# Patient Record
Sex: MALE | Race: WHITE | NOT HISPANIC OR LATINO | Employment: FULL TIME | URBAN - METROPOLITAN AREA
[De-identification: names, ages, dates, MRNs, and addresses within clinical notes are randomized per-mention and may not be internally consistent; named-entity substitution may affect disease eponyms.]

---

## 2018-10-29 ENCOUNTER — LAB (OUTPATIENT)
Dept: LAB | Facility: HOSPITAL | Age: 63
End: 2018-10-29

## 2018-10-29 ENCOUNTER — OFFICE VISIT (OUTPATIENT)
Dept: NEUROLOGY | Facility: CLINIC | Age: 63
End: 2018-10-29

## 2018-10-29 ENCOUNTER — APPOINTMENT (OUTPATIENT)
Dept: LAB | Facility: HOSPITAL | Age: 63
End: 2018-10-29

## 2018-10-29 VITALS
SYSTOLIC BLOOD PRESSURE: 123 MMHG | DIASTOLIC BLOOD PRESSURE: 78 MMHG | WEIGHT: 237 LBS | HEIGHT: 78 IN | BODY MASS INDEX: 27.42 KG/M2

## 2018-10-29 DIAGNOSIS — R26.89 IMBALANCE: Primary | ICD-10-CM

## 2018-10-29 DIAGNOSIS — G62.9 NEUROPATHY: ICD-10-CM

## 2018-10-29 LAB — VIT B12 BLD-MCNC: 1302 PG/ML (ref 211–911)

## 2018-10-29 PROCEDURE — 36415 COLL VENOUS BLD VENIPUNCTURE: CPT

## 2018-10-29 PROCEDURE — 99204 OFFICE O/P NEW MOD 45 MIN: CPT | Performed by: PSYCHIATRY & NEUROLOGY

## 2018-10-29 PROCEDURE — 82525 ASSAY OF COPPER: CPT

## 2018-10-29 PROCEDURE — 84446 ASSAY OF VITAMIN E: CPT

## 2018-10-29 PROCEDURE — 82607 VITAMIN B-12: CPT

## 2018-10-29 RX ORDER — BRIMONIDINE TARTRATE/TIMOLOL 0.2%-0.5%
DROPS OPHTHALMIC (EYE)
COMMUNITY
Start: 2018-09-23 | End: 2021-01-26

## 2018-10-29 RX ORDER — METFORMIN HYDROCHLORIDE 500 MG/1
500 TABLET, FILM COATED, EXTENDED RELEASE ORAL 2 TIMES DAILY
COMMUNITY
Start: 2013-09-05 | End: 2020-10-13 | Stop reason: SDUPTHER

## 2018-10-29 RX ORDER — GABAPENTIN 100 MG/1
CAPSULE ORAL 3 TIMES DAILY
COMMUNITY
Start: 2018-10-08 | End: 2018-10-29 | Stop reason: SDUPTHER

## 2018-10-29 RX ORDER — GABAPENTIN 100 MG/1
200 CAPSULE ORAL 3 TIMES DAILY
Qty: 180 CAPSULE | Refills: 1 | Status: SHIPPED | OUTPATIENT
Start: 2018-10-29 | End: 2019-06-07 | Stop reason: SDUPTHER

## 2018-10-29 RX ORDER — LEVOTHYROXINE SODIUM 0.05 MG/1
TABLET ORAL DAILY
COMMUNITY
Start: 2013-09-05 | End: 2018-11-29

## 2018-10-29 RX ORDER — ATORVASTATIN CALCIUM 10 MG/1
TABLET, FILM COATED ORAL
COMMUNITY
Start: 2018-10-07 | End: 2021-01-26

## 2018-10-29 RX ORDER — SERTRALINE HYDROCHLORIDE 100 MG/1
TABLET, FILM COATED ORAL DAILY
COMMUNITY
Start: 2013-09-05 | End: 2022-04-20

## 2018-10-29 RX ORDER — PEN NEEDLE, DIABETIC 29 G X1/2"
NEEDLE, DISPOSABLE MISCELLANEOUS
COMMUNITY
Start: 2018-09-04

## 2018-10-29 RX ORDER — ENALAPRIL MALEATE 5 MG/1
TABLET ORAL
COMMUNITY
Start: 2018-10-07 | End: 2021-01-26

## 2018-10-29 RX ORDER — CANAGLIFLOZIN 300 MG/1
TABLET, FILM COATED ORAL
COMMUNITY
Start: 2018-10-07 | End: 2020-10-09

## 2018-10-29 RX ORDER — EXENATIDE 2 MG/.85ML
INJECTION, SUSPENSION, EXTENDED RELEASE SUBCUTANEOUS
COMMUNITY
Start: 2018-10-09 | End: 2020-10-13 | Stop reason: SDUPTHER

## 2018-10-29 NOTE — PROGRESS NOTES
Subjective:    CC: Tom Blair is seen today in consultation at the request of Mary Kahn,* for Peripheral Neuropathy (leg pain,   foot drop left foot )       HPI:  Patient is a 63-year-old male with past medical history of type 2 diabetes referred to the clinic for the evaluation of peripheral neuropathy.  He reports that he was diagnosed with diabetes about 6 years ago and started having  pain, tingling and numbness involving both his feet about 3 years ago.  Over the period of time, the symptoms have become worse and it has affected his balance.  He reports that the when he walks, his left foot drags on the ground and that the has affected his balance and walking.  He has seen podiatrist in the past for this problem and has been wearing orthotics in both his shoes which has helped somewhat.  He reports that the pain is the most significant symptom associated with neuropathy.  It involves top of his feet as well as bottom of his feet.  He denies similar symptoms in his hands.  He tries to keep blood sugars under good control and tries to keep HB A1c between 6-7.  He was recently started on Neurontin 100 mg at bedtime which was recently increased to 100 mg twice a day and he reports that it has helped better control pain but he still gets the pain.  He denies any side effects with  Neurontin use.  It is difficult for him to walk in darker environment.    The following portions of the patient's history were reviewed today and updated as of 10/29/2018  : allergies, social history and problem list.  This document will be scanned to patient's chart.      Current Outpatient Prescriptions:   •  aspirin 81 MG tablet, Take  by mouth Daily., Disp: , Rfl:   •  atorvastatin (LIPITOR) 10 MG tablet, , Disp: , Rfl:   •  BYDUREON BCISE 2 MG/0.85ML auto-injector injection, , Disp: , Rfl:   •  Cholecalciferol (VITAMIN D) 1000 units tablet, Take  by mouth., Disp: , Rfl:   •  COMBIGAN 0.2-0.5 % ophthalmic solution, ,  "Disp: , Rfl:   •  enalapril (VASOTEC) 5 MG tablet, , Disp: , Rfl:   •  FORTEO 600 MCG/2.4ML injection, , Disp: , Rfl:   •  gabapentin (NEURONTIN) 100 MG capsule, Take 2 capsules by mouth 3 (Three) Times a Day., Disp: 180 capsule, Rfl: 1  •  INVOKANA 300 MG tablet, , Disp: , Rfl:   •  levothyroxine (LEVOXYL) 50 MCG tablet, Take  by mouth Daily., Disp: , Rfl:   •  metFORMIN (GLUMETZA) 500 MG (MOD) 24 hr tablet, Take  by mouth., Disp: , Rfl:   •  metoprolol tartrate (LOPRESSOR) 25 MG tablet, Take  by mouth Daily (Monday-Friday)., Disp: , Rfl:   •  sertraline (ZOLOFT) 100 MG tablet, Take  by mouth Daily., Disp: , Rfl:   •  ULTICARE MINI PEN NEEDLES 31G X 6 MM misc, , Disp: , Rfl:    Past Medical History:   Diagnosis Date   • Diabetes mellitus (CMS/HCC)    • Hyperlipidemia       No past surgical history on file.   Family History   Problem Relation Age of Onset   • Alzheimer's disease Mother    • Dementia Mother    • Diabetes Mother    • Cancer Father    • Diabetes Father    • Heart disease Father       Review of Systems   Constitutional: Negative.    HENT: Negative.    Eyes: Positive for blurred vision.   Respiratory: Negative.    Cardiovascular: Negative.    Gastrointestinal: Negative.    Endocrine: Negative.    Genitourinary: Negative.    Neurological: Positive for numbness.   Hematological: Negative.        All other systems reviewed and are negative     Objective:    /78   Ht 200.7 cm (79\")   Wt 108 kg (237 lb)   BMI 26.70 kg/m²     Neurology Exam:    General apperance: NAD.     Mental status: Alert, awake and oriented to time place and person.    Recent and Remote memory: Can recall 3/3 objects at 5 minutes. Can recall historical events.     Attention span and Concentration: Serial 7s: Normal.     Fund of knowledge:  Normal.     Language and Speech: No aphasia or dysarthria.    Naming , Repitition and Comprehension:  Can name objects, repeat a sentence and follow commands. Speech is clear and fluent with " good repetition, comprehension, and naming.    Cranial Nerves:   CN II: Visual fields are full. Intact. Fundi - Normal, No papillederma, Pupils - CARO  CN III, IV and VI: Extraocular movements are intact. Normal saccades.   CN V: Facial sensation is intact.   CN VII: Muscles of facial expression reveal no asymmetry. Intact.   CN VIII: Hearing is intact. Whispered voice intact.   CN IX and X: Palate elevates symmetrically. Intact  CN XI: Shoulder shrug is intact.   CN XII: Tongue is midline without evidence of atrophy or fasciculation.     Motor:  Right UE muscle strength 5/5. Normal tone.     Left UE muscle strength 5/5. Normal tone.      Right LE muscle strength5/5. Normal tone.     Left LE muscle strength 5/5. Normal tone.      Sensory: Normal light touch, vibration and pinprick sensation bilaterally.    DTRs: 2+ bilaterally in upper extremities and 2+ right knee 3+ left knee and 1+ bilateral ankles.    Babinski: Positive bilaterally.    Co-ordination: Normal finger-to-nose, heel to shin B/L.    Rhomberg: Negative.    Gait: Normal.    Cardiovascular: Regular rate and rhythm without murmur, gallop or rub.    Assessment and Plan:  1. Neuropathy  Patient with long-term history of type 2 diabetes presenting with the pain involving both his feet as well as balance issues caused by diabetic neuropathy.  Since Neurontin 100 mg twice a day dose has helped somewhat with pain, it will be increased to 200 mg 3 times a day dose in next 4 weeks for symptomatic relief.  I explained to him that I did not appreciate any dorsiflexion weakness and I did not appreciate foot drop on theleft however, when he walks he does drags his left foot somewhat.  He does have high arched feet bilaterally which is affecting his balance as well.  Continue using the orthotics.  Will refer him to balance and gait therapy.  I have advised him to continue with the strict glycemic control to prevent further progression of neuropathy as well.On my  exam, he was found to have  Babinski positive bilaterally and very  brisk left knee DTR.   Will order MRI brain for further evaluation as well as vitamin B12, copper  the vitamin E levels to rule out possibility of myeloneuropathy.  - Vitamin B12; Future  - Copper, Serum; Future  - Vitamin E; Future    2. Imbalance    - Ambulatory Referral to Physical Therapy (Balance and Gait therapy )  - MRI Brain Without Contrast; Future       No Follow-up on file.     Delfino Gunderson MD

## 2018-10-30 ENCOUNTER — TELEPHONE (OUTPATIENT)
Dept: NEUROLOGY | Facility: CLINIC | Age: 63
End: 2018-10-30

## 2018-10-30 NOTE — TELEPHONE ENCOUNTER
----- Message from Delfino Gunderson MD sent at 10/30/2018  9:14 AM EDT -----  Inform patient normal.  B12 levels are above normal.

## 2018-11-01 LAB — COPPER SERPL-MCNC: 111 UG/DL (ref 72–166)

## 2018-11-02 LAB
A-TOCOPHEROL VIT E SERPL-MCNC: 10.8 MG/L (ref 9–29)
GAMMA TOCOPHEROL SERPL-MCNC: 2.1 MG/L (ref 0.5–4.9)

## 2018-11-06 ENCOUNTER — TELEPHONE (OUTPATIENT)
Dept: NEUROLOGY | Facility: CLINIC | Age: 63
End: 2018-11-06

## 2018-11-06 NOTE — TELEPHONE ENCOUNTER
----- Message from Delfino Gunderson MD sent at 11/5/2018  4:18 PM EST -----  Inform patient normal.  Vitamin E level is normal.

## 2018-11-29 ENCOUNTER — OFFICE VISIT (OUTPATIENT)
Dept: NEUROLOGY | Facility: CLINIC | Age: 63
End: 2018-11-29

## 2018-11-29 VITALS
SYSTOLIC BLOOD PRESSURE: 151 MMHG | WEIGHT: 237 LBS | HEIGHT: 78 IN | DIASTOLIC BLOOD PRESSURE: 88 MMHG | BODY MASS INDEX: 27.42 KG/M2

## 2018-11-29 DIAGNOSIS — G62.9 NEUROPATHY: ICD-10-CM

## 2018-11-29 DIAGNOSIS — R26.89 IMBALANCE: ICD-10-CM

## 2018-11-29 PROCEDURE — 99213 OFFICE O/P EST LOW 20 MIN: CPT | Performed by: PSYCHIATRY & NEUROLOGY

## 2018-11-29 NOTE — PROGRESS NOTES
Subjective:    CC: Tom Blair is in clinic today for follow up for  diabetic neuropathy and bilateral leg weakness.    HPI:  He is in clinic for regular follow-up.  Since the last visit, he reports that the increasing gabapentin to 200 mg 3 times a day dose has helped significantly reduce the pain, tingling and numbness associated with diabetic neuropathy.  He continues to do physical therapy as per schedule.  He had MRI brain without contrast which I reviewed personally and it did not reveal any acute intracranial abnormalities.  Mild nonspecific chronic small vessel changes were seen.  Vitamin E and Copper levels are normal.    The following portions of the patient's history were reviewed and updated as of 11/29/2018: allergies, social history and problem list.       Current Outpatient Medications:   •  aspirin 81 MG tablet, Take  by mouth Daily., Disp: , Rfl:   •  atorvastatin (LIPITOR) 10 MG tablet, , Disp: , Rfl:   •  BYDUREON BCISE 2 MG/0.85ML auto-injector injection, , Disp: , Rfl:   •  Cholecalciferol (VITAMIN D) 1000 units tablet, Take  by mouth., Disp: , Rfl:   •  COMBIGAN 0.2-0.5 % ophthalmic solution, , Disp: , Rfl:   •  enalapril (VASOTEC) 5 MG tablet, , Disp: , Rfl:   •  FORTEO 600 MCG/2.4ML injection, , Disp: , Rfl:   •  gabapentin (NEURONTIN) 100 MG capsule, Take 2 capsules by mouth 3 (Three) Times a Day., Disp: 180 capsule, Rfl: 1  •  INVOKANA 300 MG tablet, , Disp: , Rfl:   •  metFORMIN (GLUMETZA) 500 MG (MOD) 24 hr tablet, Take 500 mg by mouth 2 (Two) Times a Day., Disp: , Rfl:   •  metoprolol tartrate (LOPRESSOR) 25 MG tablet, Take  by mouth Daily (Monday-Friday)., Disp: , Rfl:   •  sertraline (ZOLOFT) 100 MG tablet, Take  by mouth Daily., Disp: , Rfl:   •  ULTICARE MINI PEN NEEDLES 31G X 6 MM misc, , Disp: , Rfl:    Past Medical History:   Diagnosis Date   • Diabetes mellitus (CMS/HCC)    • Hyperlipidemia       No past surgical history on file.   Family History   Problem Relation Age of Onset  "  • Alzheimer's disease Mother    • Dementia Mother    • Diabetes Mother    • Cancer Father    • Diabetes Father    • Heart disease Father         Review of Systems  Objective:    /88   Ht 200.7 cm (79.02\")   Wt 108 kg (237 lb)   BMI 26.69 kg/m²     Neurology Exam:    General apperance: NAD.     Mental status: Alert, awake and oriented to time place and person.    Recent and Remote memory: Can recall 3/3 objects at 5 minutes. Can recall historical events.     Attention span and Concentration: Serial 7s: Normal.     Fund of knowledge:  Normal.     Language and Speech: No aphasia or dysarthria.    Naming , Repitition and Comprehension:  Can name objects, repeat a sentence and follow commands. Speech is clear and fluent with good repetition, comprehension, and naming.    Cranial Nerves:   CN II: Visual fields are full. Intact. Fundi - Normal, No papillederma, Pupils - CARO  CN III, IV and VI: Extraocular movements are intact. Normal saccades.   CN V: Facial sensation is intact.   CN VII: Muscles of facial expression reveal no asymmetry. Intact.   CN VIII: Hearing is intact. Whispered voice intact.   CN IX and X: Palate elevates symmetrically. Intact  CN XI: Shoulder shrug is intact.   CN XII: Tongue is midline without evidence of atrophy or fasciculation.     Motor:  Right UE muscle strength 5/5. Normal tone.     Left UE muscle strength 5/5. Normal tone.      Right LE muscle strength5/5 except the mild dorsiflexion weakness.    Left LE muscle strength 5/5 except mild dorsiflexion weakness.    Sensory: Normal light touch, vibration and pinprick sensation bilaterally.    DTRs: 2+ bilaterally in upper extremities and 2+ right knee 3+ left knee and 1+ bilateral ankles.     Babinski: Positive bilaterally.    Co-ordination: Normal finger-to-nose, heel to shin B/L.    Rhomberg: Negative.    Gait: Normal.    Cardiovascular: Regular rate and rhythm without murmur, gallop or rub.    Assessment and Plan:  1. " Neuropathy  Diabetic neuropathy in a patient with long-term history of diabetes.  With gabapentin 200 mg 3 times a day dose, the pain is significantly better.  However, he continues to have difficulty with balance.  MRI brain was unremarkable.  Vitamin E and Copper levels were normal.  Today on my examination, I noticed the fasciculation involving both the calf muscles.  In addition to that, he has positive Babinski bilaterally.  I will order EMG/nerve conduction study for further evaluation and to rule out possibility of motor neuron disease.  No tongue fasciculations were noted.    2. Imbalance  He continues to have difficulty with balance.  I've advised him to continue with physical therapy.  Continue using gabapentin as per schedule.  I'll see him back in clinic in 6 weeks.       I spent 15 minutes face to face with the patient and spent 10 minutes of this time counseling and discussing about taking medication regularly, possible side effects with medication use, importance of good sleep hygiene, good hydration and regular exercise.    Return in about 6 weeks (around 1/10/2019).

## 2018-12-04 ENCOUNTER — HOSPITAL ENCOUNTER (OUTPATIENT)
Dept: PHYSICAL THERAPY | Facility: HOSPITAL | Age: 63
Setting detail: THERAPIES SERIES
Discharge: HOME | End: 2018-12-04

## 2018-12-04 ENCOUNTER — TRANSCRIBE ORDERS (OUTPATIENT)
Dept: PHYSICAL THERAPY | Facility: HOSPITAL | Age: 63
End: 2018-12-04

## 2018-12-04 DIAGNOSIS — R26.89 BALANCE PROBLEM: Primary | ICD-10-CM

## 2018-12-04 DIAGNOSIS — M21.372 LEFT FOOT DROP: Primary | ICD-10-CM

## 2018-12-04 DIAGNOSIS — R26.89 BALANCE PROBLEM: ICD-10-CM

## 2018-12-04 DIAGNOSIS — R26.9 GAIT ABNORMALITY: ICD-10-CM

## 2018-12-04 DIAGNOSIS — M21.372 LEFT FOOT DROP: ICD-10-CM

## 2018-12-04 DIAGNOSIS — R26.9 GAIT DIFFICULTY: ICD-10-CM

## 2018-12-04 PROCEDURE — 97110 THERAPEUTIC EXERCISES: CPT | Performed by: PHYSICAL THERAPIST

## 2018-12-04 PROCEDURE — 97161 PT EVAL LOW COMPLEX 20 MIN: CPT | Performed by: PHYSICAL THERAPIST

## 2018-12-04 NOTE — THERAPY EVALUATION
.Outpatient Physical Therapy Neuro Initial Evaluation  [facility]     Patient Name: Tom Blair  : 1955  MRN: [id number]  Today's Date: 2018      Visit Date: 2018    There is no problem list on file for this patient.       Past Medical History:   Diagnosis Date   • Diabetes mellitus (CMS/[facility])    • Hyperlipidemia         History reviewed. No pertinent surgical history.      Visit Dx:     ICD-10-CM ICD-9-CM   1. Balance problem R26.89 781.99   2. Gait abnormality R26.9 781.2   3. Left foot drop M21.372 736.79       Patient History     Row Name 18 1000             History    Chief Complaint  Balance Problems;Pain  -MW      Type of Pain  Hip pain right  -MW      Date Current Problem(s) Began  -- 6 years ago  -MW      Brief Description of Current Complaint  Pt. reports that he was diagnosed with diabetes about 6 years ago and started having  pain, tingling and numbness involving both his feet about 3 years ago.  Over the period of time, the symptoms have become worse and it has affected his balance.  He reports that the when he walks, his left foot drags on the ground and that the has affected his balance and walking.  He has seen podiatrist in the past for this problem and has been wearing orthotics in both his shoes which has helped somewhat.  He reports that the pain is the most significant symptom associated with neuropathy.  It involves top of his feet as well as bottom of his feet.  He denies similar symptoms in his hands.  He tries to keep blood sugars under good control and tries to keep HB A1c between 6-7. Pt. was seeing PT at  for two months for balance but pt reports that it did not improve.  -MW      Patient/Caregiver Goals  -- improve balance, decrease pain in R hip  -MW      Hand Dominance  right-handed  -MW      Occupation/sports/leisure activities  national account rep; semi-retired  -MW      What clinical tests have you had for this problem?  -- pt scheduled to have nerve  conduction  -MW         Pain     Pain Location  Hip right  -MW      Pain at Present  2  -MW      Pain at Best  0  -MW      Pain at Worst  9  -MW      Pain Frequency  Intermittent  -MW      Pain Description  Stabbing;Burning  -MW      What Performance Factors Make the Current Problem(s) WORSE?  walking, exercising (cycling 15 miles day: indoor)  -MW      What Performance Factors Make the Current Problem(s) BETTER?  mornings, gabapentin/tylenol  -MW      Is your sleep disturbed?  No  -MW         Fall Risk Assessment    Any falls in the past year:  Yes  -MW      Number of falls reported in the last 12 months  5  -MW      Factors that contributed to the fall:  Tripped;Lost balance  -MW         Daily Activities    Primary Language  English  -      Teaching needs identified  Home Exercise Program;Falls Prevention;Management of Condition  -MW      Pt Participated in POC and Goals  Yes  -MW        User Key  (r) = Recorded By, (t) = Taken By, (c) = Cosigned By    Initials Name Provider Type    MW Mercedes Anderson, PT Physical Therapist              PT Neuro     Row Name 12/04/18 1000             Precautions and Contraindications    Precautions/Limitations  fall precautions  -MW         Subjective Pain    Able to rate subjective pain?  yes  -MW      Pre-Treatment Pain Level  2  -MW      Post-Treatment Pain Level  2  -MW      Subjective Pain Comment  R hip  -MW         Home Living    Living Arrangements  [place]  -      Home Accessibility  stairs to enter home;stairs within home  -MW      Number of Stairs, Main Entrance  one  -MW      Stair Railings, Main Entrance  none post  -MW      Number of Stairs, Second Entrance  -- 14  -MW      Stair Railings, Second Entrance  railing on left side (ascending)  -      Home Equipment  Cane;Grab bars  -      Living Environment Comment  lives with wife; she works at [employer], son in med school staying at the house  -         Vision-Basic Assessment    Current Vision  Wears glasses all  the time  -MW         Cognition    Overall Cognitive Status  WFL  -MW         Sensation    Light Touch  Partial deficits in the RLE;Partial deficits in the LLE  -MW      Additional Comments  decreased B LEs below mid calf down into toes  -MW         Proprioception    Proprioception  absent B big toes  -MW         Posture/Observations    Posture- WNL  Posture is WNL  -MW         Coordination    Coordination Tests  Rapid Alternating  -MW      Rapid Alternating  Impaired;Left:  -MW         General ROM    RT Lower Ext  Rt Ankle Dorsiflexion  -MW      LT Lower Ext  Lt Ankle Dorsiflexion  -MW      GENERAL ROM COMMENTS  --  -MW         Right Lower Ext    Rt Ankle Dorsiflexion AROM  11 degrees  -MW         Left Lower Ext    Lt Ankle Dorsiflexion AROM  4 degrees  -MW         MMT (Manual Muscle Testing)    Rt Lower Ext  Rt Hip Flexion;Rt Hip Extension;Rt Hip ABduction;Rt Hip ADduction;Rt Knee Extension;Rt Knee Flexion;Rt Ankle Plantarflexion;Rt Ankle Dorsiflexion  -MW      Lt Lower Ext  Lt Hip Flexion;Lt Hip Extension;Lt Hip ABduction;Lt Hip ADduction;Lt Knee Extension;Lt Knee Flexion;Lt Ankle Plantarflexion;Lt Ankle Dorsiflexion  -MW         MMT Right Lower Ext    Rt Hip Flexion MMT, Gross Movement  (4-/5) good minus  -MW      Rt Hip Extension MMT, Gross Movement  (3+/5) fair plus  -MW      Rt Hip ABduction MMT, Gross Movement  (3+/5) fair plus  -MW      Rt Hip ADduction MMT, Gross Movement  (4-/5) good minus  -MW      Rt Knee Extension MMT, Gross Movement  (4-/5) good minus  -MW      Rt Knee Flexion MMT, Gross Movement  (4-/5) good minus  -MW      Rt Ankle Plantarflexion MMT, Gross Movement  (4-/5) good minus  -MW      Rt Ankle Dorsiflexion MMT, Gross Movement  (4-/5) good minus  -MW         MMT Left Lower Ext    Lt Hip Flexion MMT, Gross Movement  (3+/5) fair plus  -MW      Lt Hip Extension MMT, Gross Movement  (3+/5) fair plus  -MW      Lt Hip ABduction MMT, Gross Movement  (3+/5) fair plus  -MW      Lt Hip ADduction MMT,  Gross Movement  (4-/5) good minus  -MW      Lt Knee Extension MMT, Gross Movement  (4-/5) good minus  -MW      Lt Knee Flexion MMT, Gross Movement  (3+/5) fair plus  -MW      Lt Ankle Plantarflexion MMT, Gross Movement  (3-/5) fair minus  -MW      Lt Ankle Dorsiflexion MMT, Gross Movement  (3-/5) fair minus  -MW         Bed Mobility Assessment/Treatment    Comment (Bed Mobility)  independent  -MW         Transfers    Bed-Chair Tribune (Transfers)  supervision  -MW      Chair-Bed Tribune (Transfers)  supervision  -MW      Sit-Stand Tribune (Transfers)  supervision UE A  -MW      Stand-Sit Tribune (Transfers)  supervision UE A  -MW         Gait/Stairs Assessment/Training    Tribune Level (Gait)  contact guard  -MW      Deviations/Abnormal Patterns (Gait)  gait speed decreased;ruby decreased  -MW      Bilateral Gait Deviations  forward flexed posture  -MW      Left Sided Gait Deviations  foot drop/toe drag;hip circumduction  -MW      Tribune Level (Stairs)  contact guard  -MW      Handrail Location (Stairs)  right side (ascending)  -MW      Number of Steps (Stairs)  12  -MW      Ascending Technique (Stairs)  step-over-step  -MW      Descending Technique (Stairs)  step-over-step  -MW      Stairs, Safety Issues  balance decreased during turns  -MW        User Key  (r) = Recorded By, (t) = Taken By, (c) = Cosigned By    Initials Name Provider Type    Mercedes Arana, PT Physical Therapist                  Therapy Education  Given: HEP  Program: New  How Provided: Verbal, Demonstration, Written  Provided to: Patient  Level of Understanding: Verbalized, Demonstrated, Teach back education performed    PT OP Goals     Row Name 12/04/18 1100          PT Short Term Goals    STG Date to Achieve  01/15/19  -MW     STG 1  Patient to improve VALLEJO balance score to >/= 43/56 to decrease client's risk of falls.  -MW     STG 1 Progress  New  -MW     STG 2  Patient to perform TUG within 13 sec  without LOB for improved functional mobility.  -     STG 2 Progress  New  -MW     STG 3  Patient to improve FGA score to >/= 15/30 to decrease client's risk of falls.  -MW     STG 3 Progress  New  -MW     STG 4  Pt. to be able to rosangela/doff L AFO/toe-off brace for improved gait mechanics.  -     STG 4 Progress  New  -        Long Term Goals    LTG Date to Achieve  02/26/19  -MW     LTG 1  Patient to improve VALLEJO balance score to >/= 50/56 to decrease client's risk of falls.  -MW     LTG 1 Progress  New  -MW     LTG 2  Patient to perform TUG within 11 sec without LOB for improved functional mobility.  -MW     LTG 2 Progress  New  -MW     LTG 3  Patient to improve FGA score to >/= 24/30 to decrease client's risk of falls.  -MW     LTG 3 Progress  New  -MW     LTG 4  Patient to ambulate 10 meters without AD within 8 sec without LOB for improved gait ruby and functional mobility.  -     LTG 4 Progress  New  -     LTG 5  Pt. to be I with HEP.  -     LTG 5 Progress  New  -        Time Calculation    PT Goal Re-Cert Due Date  03/04/19  -       User Key  (r) = Recorded By, (t) = Taken By, (c) = Cosigned By    Initials Name Provider Type    Mercedes Arana, PT Physical Therapist          PT Assessment/Plan     Row Name 12/04/18 1513 12/04/18 1000       PT Assessment    Functional Limitations  --  Decreased safety during functional activities;Impaired gait;Impaired locomotion;Limitations in community activities;Performance in work activities  -    Impairments  --  Balance;Endurance;Gait;Locomotion;Muscle strength;Pain;Range of motion;Sensation;Posture impaired proprioception  -    Assessment Comments  --  -  Pt. presents with stable impairements secondary to a DM/neuropathy diagnosis.  Pt. to benefit from PT services to improve gait, balance, strength, transfers and overall functional mobility.  Pt. to benefit from L AFO/toe-off brace and asked for order.  Pt. issued HEP for balance, see for  details.  -MW    Please refer to paper survey for additional self-reported information  --  Yes  -MW    Rehab Potential  --  Good  -MW    Patient/caregiver participated in establishment of treatment plan and goals  --  Yes  -MW    Patient would benefit from skilled therapy intervention  --  Yes  -MW       PT Plan    PT Frequency  --  1x/week  -MW    Predicted Duration of Therapy Intervention (Therapy Eval)  --  12 visits  -MW    Planned CPT's?  --  PT EVAL LOW COMPLEXITY: 17565;PT THER PROC EA 15 MIN: 17767;PT NEUROMUSC RE-EDUCATION EA 15 MIN: 39252;PT GAIT TRAINING EA 15 MIN: 51512  -MW    PT Plan Comments  --  PT services to improve gait, balance, strength, transfers and overall functional mobility.  -MW      User Key  (r) = Recorded By, (t) = Taken By, (c) = Cosigned By    Initials Name Provider Type    Mercedes Arana, PT Physical Therapist             Exercises     Row Name 12/04/18 1000             Subjective Pain    Able to rate subjective pain?  yes  -MW      Pre-Treatment Pain Level  2  -MW      Post-Treatment Pain Level  2  -MW      Subjective Pain Comment  R hip  -MW         Total Minutes    37876 - PT Therapeutic Exercise Minutes  10  -MW         Exercise 1    Exercise Name 1  Issued and performed HEP, see for details.  -MW      Time 1  10 min  -MW        User Key  (r) = Recorded By, (t) = Taken By, (c) = Cosigned By    Initials Name Provider Type    Mercedes Arana, PT Physical Therapist                      Outcome Measure Options: 10 Meter Walk, James Balance, FGA (Functional Gait Assessment), Timed Up and Go (TUG)  10 Meter Walk Test Self-Selected Velocity  Self-Selected Velocity: Trial 1: 10.78 sec.(CGA)  10 Meter Walk Test Fast Velocity  10 Meter Walk Fast Velocity: Trial 1: 9.49 sec.(CGA)  James Balance Scale  Sitting to Standing: able to stand independently using hands  Standing Unsupported: able to stand safely for 2 minutes  Sitting with Back Unsupported but Feet Supported on Floor or  on Stool: able to sit safely and securely for 2 minutes  Standing to Sitting: controls descent by using hands  Transfers: able to transfer safely definite need of hands  Standing Unsupported with Eyes Closed: able to stand 10 seconds with supervision  Standing Unsupported with Feet Together: able to place feet together independently and stand 1 minute with supervision  Reaching Forward with Outstretched Arm While Standing: can reach forward confidently 25 cm (10 inches)   Object From the Floor From a Standing Position: able to  object but needs supervision  Turning to Look Behind Over Left and Right Shoulders While Standing: needs assist to keep from losing balance or falling  Turn 360 Degrees: able to turn 360 degrees safely but slowly  Place Alternate Foot on Step or Stool While Standing Unsupported: able to complete > 2 steps needs minimal assist  Standing Unsupported with One Foot in Front: able to place foot ahead independently and hold 30 seconds  Standing on One Leg: tries to lift leg unable to hold 3 seconds but remains standing independently  James Total Score: 37  Functional Gait Assessment (FGA)  Gait Level Surface: Moderate Impairment  Change in Gait Speed: Moderate Impairment  Gait with Horizontal Head Turns: Severe Impairment  Gait with Vertical Head Turns: Severe Impairment  Gait and Pivot Turn: Mild Impairment  Step Over Obstacle: Moderate Impairment  Gait with Narrow Base of Support: Severe Impairment  Gait with Eyes Closed: Severe Impairment  Ambulating Backwards: Moderate Impairment  Steps: Mild Impairment  FGA Total Score: 8  Timed Up and Go (TUG)  TUG Test 1: 15.04 seconds(CGA)  TUG Test 2: 12.43 seconds(min A with LOB turning to sit into chair)    Time Calculation:   Start Time: 1000   Therapy Suggested Charges     Code   Minutes Charges    33432 (CPT®) Hc Pt Neuromusc Re Education Ea 15 Min      84509 (CPT®) Hc Pt Ther Proc Ea 15 Min 10 1    95682 (CPT®) Hc Gait Training Ea 15 Min       50219 (CPT®) Hc Pt Therapeutic Act Ea 15 Min      47568 (CPT®) Hc Pt Manual Therapy Ea 15 Min      53733 (CPT®) Hc Pt Ther Massage- Per 15 Min      28327 (CPT®) Hc Pt Iontophoresis Ea 15 Min      04836 (CPT®) Hc Pt Elec Stim Ea-Per 15 Min      77800 (CPT®) Hc Pt Ultrasound Ea 15 Min      59106 (CPT®) Hc Pt Self Care/Mgmt/Train Ea 15 Min      42781 (CPT®) Hc Pt Prosthetic (S) Train Initial Encounter, Each 15 Min      81742 (CPT®) Hc Orthotic(S) Mgmt/Train Initial Encounter, Each 15min      65942 (CPT®) Hc Pt Aquatic Therapy Ea 15 Min      58941 (CPT®) Hc Pt Orthotic(S)/Prosthetic(S) Encounter, Each 15 Min       (CPT®) Hc Pt Electrical Stim Unattended      Total  10 1        Therapy Charges for Today     Code Description Service Date Service Provider Modifiers Qty    67025884532 HC PT EVAL LOW COMPLEXITY 4 12/4/2018 Mercedes Anderson, PT GP 1    05379215876 HC PT THER PROC EA 15 MIN 12/4/2018 Mercedes Anderson, PT GP 1          PT G-Codes  Outcome Measure Options: 10 Meter Walk, James Balance, FGA (Functional Gait Assessment), Timed Up and Go (TUG)  James Total Score: 37  FGA Total Score: 8  TUG Test 1: 15.04 seconds(CGA)  TUG Test 2: 12.43 seconds(min A with LOB turning to sit into chair)         Mercedes Anderson, PT  12/4/2018

## 2018-12-10 ENCOUNTER — HOSPITAL ENCOUNTER (OUTPATIENT)
Dept: PHYSICAL THERAPY | Facility: HOSPITAL | Age: 63
Setting detail: THERAPIES SERIES
Discharge: HOME | End: 2018-12-10

## 2018-12-10 DIAGNOSIS — R26.9 GAIT ABNORMALITY: ICD-10-CM

## 2018-12-10 DIAGNOSIS — R26.89 BALANCE PROBLEM: Primary | ICD-10-CM

## 2018-12-10 PROCEDURE — 97112 NEUROMUSCULAR REEDUCATION: CPT | Performed by: PHYSICAL THERAPIST

## 2018-12-10 PROCEDURE — 97110 THERAPEUTIC EXERCISES: CPT | Performed by: PHYSICAL THERAPIST

## 2018-12-10 NOTE — THERAPY TREATMENT NOTE
"    Outpatient Physical Therapy Neuro Treatment Note  [facility]     Patient Name: Tom Blair  : 1955  MRN: [id number]  Today's Date: 12/10/2018      Visit Date: 12/10/2018    Visit Dx:    ICD-10-CM ICD-9-CM   1. Balance problem R26.89 781.99   2. Gait abnormality R26.9 781.2       There is no problem list on file for this patient.          PT Neuro     Row Name 12/10/18 08             Precautions and Contraindications    Precautions/Limitations  fall precautions  -MW         Subjective Pain    Able to rate subjective pain?  yes  -MW      Pre-Treatment Pain Level  5  -MW      Post-Treatment Pain Level  4  -MW      Subjective Pain Comment  R hip  -MW         Balance Skills Training    77389 -  PT Neuromuscular Reeducation Minutes  45  -MW      Training Strategies (Balance)  St. balance on blue foam with reg FARIBA, B narrow FARIBA with EC up to 20 sec with min A.  St. on blue foam with alternating tapping 10\" step x 10, one foot on step and hold with B cervical rot/flex/ext x 10 with min/mod A, B fwd step up onto/off 10\" step x 10 with min/mod A for balance.  Tall kneeling with fwd/bwd walking and B sidestepping without UE A  with pt having difficulty to perform.  St balance on rocker board R/L and ant/post with lifting 3# bar overhead x 10, B trunk rotation x 10 with mod A and LOB 80% of the time.  Sitting balance on large swiss ball with B LE marching, TKE, hip ab/adduction x 10 with min to max A with LOB 40% of the time.    -MW        User Key  (r) = Recorded By, (t) = Taken By, (c) = Cosigned By    Initials Name Provider Type    Mercedes Arana, PT Physical Therapist                  PT Assessment/Plan     Row Name 12/10/18 0800          PT Assessment    Assessment Comments  Pt. requires min to max A with sitting and standing balance activities secondary to LOB up to 80% of the time.  Pt. to benefit from skilled PT services to improve overall functional mobility.  Pt. demonstrates improved gait " "mechanics wearing toe off brace.  Called MD to remind that order was sent for L AFO.  -MW        PT Plan    PT Plan Comments  Continue with PT services to improve gait, balance, strength, transfers and overall functional mobility.  -MW       User Key  (r) = Recorded By, (t) = Taken By, (c) = Cosigned By    Initials Name Provider Type    Mercedes Arana, PT Physical Therapist               Exercises     Row Name 12/10/18 0800             Subjective Comments    Subjective Comments  \"I can't stand more then 2 seconds on one leg when I do my exercise at home.\"  -MW         Subjective Pain    Able to rate subjective pain?  yes  -MW      Pre-Treatment Pain Level  5  -MW      Post-Treatment Pain Level  4  -MW      Subjective Pain Comment  R hip  -MW         Total Minutes    92148 - PT Therapeutic Exercise Minutes  15  -MW      81358 -  PT Neuromuscular Reeducation Minutes  45  -MW         Exercise 1    Exercise Name 1  NuStep L6  -MW      Time 1  10 min  -MW      Additional Comments  B UE/LEs  -MW         Exercise 2    Exercise Name 2  Quad opposite UE/LEs  -MW      Sets 2  1  -MW      Reps 2  10  -MW      Additional Comments  issued as HEP  -MW        User Key  (r) = Recorded By, (t) = Taken By, (c) = Cosigned By    Initials Name Provider Type    Mercedes Arana, PT Physical Therapist                            Therapy Education  Given: HEP, Symptoms/condition management  Program: New  How Provided: Verbal, Written, Demonstration  Provided to: Patient  Level of Understanding: Verbalized, Teach back education performed              Time Calculation:   Start Time: 0800   Therapy Suggested Charges     Code   Minutes Charges    98528 (CPT®) Hc Pt Neuromusc Re Education Ea 15 Min 45 3    30188 (CPT®) Hc Pt Ther Proc Ea 15 Min 15 1    76076 (CPT®) Hc Gait Training Ea 15 Min      15580 (CPT®) Hc Pt Therapeutic Act Ea 15 Min      65727 (CPT®) Hc Pt Manual Therapy Ea 15 Min      15123 (CPT®) Hc Pt Ther Massage- Per 15 " Min      13684 (CPT®) Hc Pt Iontophoresis Ea 15 Min      11046 (CPT®) Hc Pt Elec Stim Ea-Per 15 Min      36654 (CPT®) Hc Pt Ultrasound Ea 15 Min      66230 (CPT®) Hc Pt Self Care/Mgmt/Train Ea 15 Min      24317 (CPT®) Hc Pt Prosthetic (S) Train Initial Encounter, Each 15 Min      46575 (CPT®) Hc Orthotic(S) Mgmt/Train Initial Encounter, Each 15min      95317 (CPT®) Hc Pt Aquatic Therapy Ea 15 Min      16692 (CPT®) Hc Pt Orthotic(S)/Prosthetic(S) Encounter, Each 15 Min       (CPT®) Hc Pt Electrical Stim Unattended      Total  60 4        Therapy Charges for Today     Code Description Service Date Service Provider Modifiers Qty    18389985779 HC PT NEUROMUSC RE EDUCATION EA 15 MIN 12/10/2018 Mercedes Anderson, PT GP 3    68045615325 HC PT THER PROC EA 15 MIN 12/10/2018 Mercedes Anderson, PT GP 1                    Mercedes Anderson, PT  12/10/2018

## 2018-12-18 ENCOUNTER — HOSPITAL ENCOUNTER (OUTPATIENT)
Dept: PHYSICAL THERAPY | Facility: HOSPITAL | Age: 63
Setting detail: THERAPIES SERIES
Discharge: HOME | End: 2018-12-18

## 2018-12-18 DIAGNOSIS — M21.372 LEFT FOOT DROP: ICD-10-CM

## 2018-12-18 DIAGNOSIS — R26.9 GAIT ABNORMALITY: ICD-10-CM

## 2018-12-18 DIAGNOSIS — R26.89 BALANCE PROBLEM: Primary | ICD-10-CM

## 2018-12-18 PROCEDURE — 97112 NEUROMUSCULAR REEDUCATION: CPT | Performed by: PHYSICAL THERAPIST

## 2018-12-18 PROCEDURE — 97110 THERAPEUTIC EXERCISES: CPT | Performed by: PHYSICAL THERAPIST

## 2018-12-18 NOTE — THERAPY TREATMENT NOTE
Outpatient Physical Therapy Neuro Treatment Note  [facility]     Patient Name: Tom Blair  : 1955  MRN: [id number]  Today's Date: 2018      Visit Date: 2018    Visit Dx:    ICD-10-CM ICD-9-CM   1. Balance problem R26.89 781.99   2. Gait abnormality R26.9 781.2   3. Left foot drop M21.372 736.79       There is no problem list on file for this patient.          PT Neuro     Row Name 18 0845             Subjective Pain    Able to rate subjective pain?  yes  -MW      Pre-Treatment Pain Level  4  -MW      Post-Treatment Pain Level  4  -MW      Subjective Pain Comment  R hip  -MW         Balance Skills Training    Training Strategies (Balance)  ST. balance on beam with fwd and B sidestepping along 8ft beam x 4 with min/mod A, standing tandem on beam with LE behind stepping fwd and back x 10 with mod A and LOB up to 50% of the time.  Standing sideways on beam with alternating tapping cone in front x 10 with mod A and LOB 50% of the time.    -MW        User Key  (r) = Recorded By, (t) = Taken By, (c) = Cosigned By    Initials Name Provider Type    Mercedes Arana, PT Physical Therapist                  PT Assessment/Plan     Row Name 18 6945          PT Assessment    Assessment Comments  Pt requires min/mod A with standing dynamic balance activities.  Pt. demonstrates B ankle supination with all activities and impaired B LE placement with gait.  Pt. to benefit from skilled PT services to improve functional mobility and meet goals.  Pt. had low BP at 103/73 following therapy and was instructed to sit and rest and pt was given juice.  Pts BP rechecked and it had dropped to 83/67.  Pt. tried to walk and felt like he was going to pass out.  Pt. taken to urgent care and pt called his wife.  -MW        PT Plan    PT Plan Comments  Continue with PT services to improve gait, balance, strength and overall functional mobility.  -MW       User Key  (r) = Recorded By, (t) = Taken By, (c) =  "Cosigned By    Initials Name Provider Type    Mercedes Arana, PT Physical Therapist               Exercises     Row Name 12/18/18 0845             Subjective Comments    Subjective Comments  \"I had my appointment for my brace(toe-off) and they've ordered it.\"  -MW         Subjective Pain    Able to rate subjective pain?  yes  -MW      Pre-Treatment Pain Level  4  -MW      Post-Treatment Pain Level  4  -MW      Subjective Pain Comment  R hip  -MW         Total Minutes    66949 - PT Therapeutic Exercise Minutes  30  -MW      63421 -  PT Neuromuscular Reeducation Minutes  15  -MW         Exercise 1    Exercise Name 1  NuStep L6  -MW      Time 1  8 min  -MW         Exercise 2    Exercise Name 2  Total gym DLP  -MW      Sets 2  2  -MW      Time 2  2 min  -MW         Exercise 3    Exercise Name 3  Issued and performed towel/foot HEP to improve ankle and intrinstic mobility  -MW      Sets 3  1  -MW      Reps 3  10  -MW        User Key  (r) = Recorded By, (t) = Taken By, (c) = Cosigned By    Initials Name Provider Type    Mercedes Arana, PT Physical Therapist                            Therapy Education  Given: Symptoms/condition management, Posture/body mechanics, HEP, Mobility training  Program: New  How Provided: Verbal, Demonstration, Written  Provided to: Patient  Level of Understanding: Verbalized, Demonstrated              Time Calculation:   Start Time: 0845   Therapy Suggested Charges     Code   Minutes Charges    34604 (CPT®) Hc Pt Neuromusc Re Education Ea 15 Min 15 1    48539 (CPT®) Hc Pt Ther Proc Ea 15 Min 30 2    86084 (CPT®) Hc Gait Training Ea 15 Min      32167 (CPT®) Hc Pt Therapeutic Act Ea 15 Min      92526 (CPT®) Hc Pt Manual Therapy Ea 15 Min      02680 (CPT®) Hc Pt Ther Massage- Per 15 Min      55790 (CPT®) Hc Pt Iontophoresis Ea 15 Min      50057 (CPT®) Hc Pt Elec Stim Ea-Per 15 Min      35677 (CPT®) Hc Pt Ultrasound Ea 15 Min      74694 (CPT®) Hc Pt Self Care/Mgmt/Train Ea 15 Min      " 08937 (CPT®) Hc Pt Prosthetic (S) Train Initial Encounter, Each 15 Min      80472 (CPT®) Hc Orthotic(S) Mgmt/Train Initial Encounter, Each 15min      89734 (CPT®) Hc Pt Aquatic Therapy Ea 15 Min      09003 (CPT®) Hc Pt Orthotic(S)/Prosthetic(S) Encounter, Each 15 Min       (CPT®) Hc Pt Electrical Stim Unattended      Total  45 3        Therapy Charges for Today     Code Description Service Date Service Provider Modifiers Qty    11352268318 HC PT NEUROMUSC RE EDUCATION EA 15 MIN 12/18/2018 Mercedes Anderson, PT GP 1    93328240224 HC PT THER PROC EA 15 MIN 12/18/2018 Mercedes Anderson, PT GP 2                    Mercedes Anderson, PT  12/18/2018

## 2019-01-02 ENCOUNTER — HOSPITAL ENCOUNTER (OUTPATIENT)
Dept: PHYSICAL THERAPY | Facility: HOSPITAL | Age: 64
Setting detail: THERAPIES SERIES
Discharge: HOME | End: 2019-01-02

## 2019-01-02 DIAGNOSIS — R26.9 GAIT ABNORMALITY: ICD-10-CM

## 2019-01-02 DIAGNOSIS — R26.89 BALANCE PROBLEM: Primary | ICD-10-CM

## 2019-01-02 PROCEDURE — 97112 NEUROMUSCULAR REEDUCATION: CPT | Performed by: PHYSICAL THERAPIST

## 2019-01-02 PROCEDURE — 97110 THERAPEUTIC EXERCISES: CPT | Performed by: PHYSICAL THERAPIST

## 2019-01-02 NOTE — THERAPY PROGRESS REPORT/RE-CERT
"    .Outpatient Physical Therapy Neuro Re-Assessment  [facility]     Patient Name: Tom Blair  : 1955  MRN: [id number]  Today's Date: 2019      Visit Date: 2019    There is no problem list on file for this patient.       Past Medical History:   Diagnosis Date   • Diabetes mellitus (CMS/HCC)    • Hyperlipidemia         No past surgical history on file.      Visit Dx:     ICD-10-CM ICD-9-CM   1. Balance problem R26.89 781.99   2. Gait abnormality R26.9 781.2               PT Neuro     Row Name 19 0800             Subjective Comments    Subjective Comments  \"I get my brace on Friday.  My right hip hasn't been hurting as much.\"  -MW         Precautions and Contraindications    Precautions/Limitations  fall precautions  -MW         Subjective Pain    Able to rate subjective pain?  yes  -MW      Pre-Treatment Pain Level  0  -MW      Post-Treatment Pain Level  0  -MW         Balance Skills Training    91384 -  PT Neuromuscular Reeducation Minutes  45  -MW      Training Strategies (Balance)  RE-assessment completed, see for details.  St. balance on blue foam with alternating heel tapping to 10\" step x 10 with min A, B fwd stepping up onto/off 10\" step from foam x 10 with min A, one foot on foam and one on 10\" step with B cerivcal rot/flex/ext x 10 with EC with min/mod A.  B straddle step up onto/off 10\" step x 10 with min A.  Tall kneeling with fwd/bwd walking with SBA, B sidestepping in tall kneeling with difficulty with lifting LE's.  B half kneeling with B cervical rot/flex/ext x 10 with min A.  Trampoline jumping x 10, B hip ab/adduction jump x 10 and B ski jump x 10 with min/mod A with LOB up to 40% of the time.  Reg FARIBA, B staggered FARBIA on trampoline with EC up to 10 sec x 2 with min A.    -MW        User Key  (r) = Recorded By, (t) = Taken By, (c) = Cosigned By    Initials Name Provider Type    Mercedes Arana, PT Physical Therapist                  Therapy Education  Given: HEP, " Symptoms/condition management  Program: New  How Provided: Verbal, Demonstration, Written  Provided to: Patient  Level of Understanding: Demonstrated, Verbalized    PT OP Goals     Row Name 01/02/19 0800          PT Short Term Goals    STG Date to Achieve  01/15/19  -MW     STG 1  Patient to improve VALLEJO balance score to >/= 43/56 to decrease client's risk of falls.  -MW     STG 1 Progress  Met  -MW     STG 2  Patient to perform TUG within 13 sec without LOB for improved functional mobility.  -MW     STG 2 Progress  Ongoing  -MW     STG 3  Patient to improve FGA score to >/= 15/30 to decrease client's risk of falls.  -MW     STG 3 Progress  Ongoing  -MW     STG 4  Pt. to be able to rosangela/doff L AFO/toe-off brace for improved gait mechanics.  -     STG 4 Progress  Ongoing  -MW        Long Term Goals    LTG Date to Achieve  02/26/19  -MW     LTG 1  Patient to improve VALLEJO balance score to >/= 50/56 to decrease client's risk of falls.  -MW     LTG 1 Progress  Ongoing  -MW     LTG 2  Patient to perform TUG within 11 sec without LOB for improved functional mobility.  -MW     LTG 2 Progress  Ongoing  -MW     LTG 3  Patient to improve FGA score to >/= 24/30 to decrease client's risk of falls.  -MW     LTG 3 Progress  Ongoing  -MW     LTG 4  Patient to ambulate 10 meters without AD within 8 sec without LOB for improved gait ruby and functional mobility.  -     LTG 4 Progress  Ongoing  -     LTG 5  Pt. to be I with HEP.  -     LTG 5 Progress  Ongoing  -        Time Calculation    PT Goal Re-Cert Due Date  03/04/19  -       User Key  (r) = Recorded By, (t) = Taken By, (c) = Cosigned By    Initials Name Provider Type    Mercedes Arana, PT Physical Therapist          PT Assessment/Plan     Row Name 01/02/19 0800          PT Assessment    Functional Limitations  Decreased safety during functional activities;Impaired gait;Impaired locomotion;Limitations in community activities;Performance in work activities   "-MW     Impairments  Balance;Endurance;Gait;Locomotion;Muscle strength;Pain;Range of motion;Sensation;Posture  -MW     Assessment Comments  Pt. met STG for VALLEJO and progressed with TUG speed.  Pt. to benefit from skilled PT services to progress towards remaining goals.  Pt. continues to demonstrate LOB up to 40% of the time with min/mod A for balance.  -MW     Please refer to paper survey for additional self-reported information  Yes  -MW     Rehab Potential  Good  -MW     Patient/caregiver participated in establishment of treatment plan and goals  Yes  -MW     Patient would benefit from skilled therapy intervention  Yes  -MW        PT Plan    PT Frequency  1x/week  -MW     Predicted Duration of Therapy Intervention (Therapy Eval)  9 visits  -MW     Planned CPT's?  PT THER PROC EA 15 MIN: 79917;PT NEUROMUSC RE-EDUCATION EA 15 MIN: 62616;PT GAIT TRAINING EA 15 MIN: 56009  -MW     PT Plan Comments  Continue with PT services to improve gait, balance, strength, and overall functional mobility.  -MW       User Key  (r) = Recorded By, (t) = Taken By, (c) = Cosigned By    Initials Name Provider Type    Mercedes Arana, PT Physical Therapist             Exercises     Row Name 01/02/19 0800             Subjective Comments    Subjective Comments  \"I get my brace on Friday.  My right hip hasn't been hurting as much.\"  -MW         Subjective Pain    Able to rate subjective pain?  yes  -MW      Pre-Treatment Pain Level  0  -MW      Post-Treatment Pain Level  0  -MW         Total Minutes    71505 - PT Therapeutic Exercise Minutes  11  -MW      60505 -  PT Neuromuscular Reeducation Minutes  45  -MW         Exercise 1    Exercise Name 1  NuStep L6  -MW      Time 1  8 min  -MW         Exercise 2    Exercise Name 2  Issued and performed HEP with B sidestepping with green tband  -MW      Time 2  3 min  -MW        User Key  (r) = Recorded By, (t) = Taken By, (c) = Cosigned By    Initials Name Provider Type    Mercedes Arana, " PT Physical Therapist                      Outcome Measure Options: 10 Meter Walk, James Balance, Timed Up and Go (TUG)  10 Meter Walk Test Self-Selected Velocity  Self-Selected Velocity: Trial 1: 11.28 sec.(SBA)  10 Meter Walk Test Fast Velocity  10 Meter Walk Fast Velocity: Trial 1: 10.21 sec.(SBA)  James Balance Scale  Sitting to Standing: able to stand without using hands and stabilize independently  Standing Unsupported: able to stand safely for 2 minutes  Sitting with Back Unsupported but Feet Supported on Floor or on Stool: able to sit safely and securely for 2 minutes  Standing to Sitting: controls descent by using hands  Transfers: able to transfer safely definite need of hands  Standing Unsupported with Eyes Closed: able to stand 10 seconds safely  Standing Unsupported with Feet Together: able to place feet together independently and stand 1 minute with supervision  Reaching Forward with Outstretched Arm While Standing: can reach forward confidently 25 cm (10 inches)   Object From the Floor From a Standing Position: able to  object safely and easily  Turning to Look Behind Over Left and Right Shoulders While Standing: looks behind from both sides and weight shifts well  Turn 360 Degrees: able to turn 360 degrees safely but slowly  Place Alternate Foot on Step or Stool While Standing Unsupported: able to complete > 2 steps needs minimal assist  Standing Unsupported with One Foot in Front: able to place foot ahead independently and hold 30 seconds  Standing on One Leg: tries to lift leg unable to hold 3 seconds but remains standing independently  James Total Score: 44  Timed Up and Go (TUG)  TUG Test 1: 14.22 seconds(SBA)    Time Calculation:   Start Time: 0800   Therapy Suggested Charges     Code   Minutes Charges    27052 (CPT®) Hc Pt Neuromusc Re Education Ea 15 Min 45 3    68956 (CPT®) Hc Pt Ther Proc Ea 15 Min 11 1    71072 (CPT®) Hc Gait Training Ea 15 Min      11851 (CPT®) Hc Pt Therapeutic  Act Ea 15 Min      16460 (CPT®) Hc Pt Manual Therapy Ea 15 Min      12107 (CPT®) Hc Pt Ther Massage- Per 15 Min      37199 (CPT®) Hc Pt Iontophoresis Ea 15 Min      08134 (CPT®) Hc Pt Elec Stim Ea-Per 15 Min      98363 (CPT®) Hc Pt Ultrasound Ea 15 Min      85071 (CPT®) Hc Pt Self Care/Mgmt/Train Ea 15 Min      22483 (CPT®) Hc Pt Prosthetic (S) Train Initial Encounter, Each 15 Min      50519 (CPT®) Hc Orthotic(S) Mgmt/Train Initial Encounter, Each 15min      64394 (CPT®) Hc Pt Aquatic Therapy Ea 15 Min      44722 (CPT®) Hc Pt Orthotic(S)/Prosthetic(S) Encounter, Each 15 Min       (CPT®) Hc Pt Electrical Stim Unattended      Total  56 4        Therapy Charges for Today     Code Description Service Date Service Provider Modifiers Qty    40980804967 HC PT NEUROMUSC RE EDUCATION EA 15 MIN 1/2/2019 Mercedes Anderson, PT GP 3    55280445616 HC PT THER PROC EA 15 MIN 1/2/2019 Mercedes Anderson, PT GP 1          PT G-Codes  Outcome Measure Options: 10 Meter Walk, James Balance, Timed Up and Go (TUG)  James Total Score: 44  TUG Test 1: 14.22 seconds(SBA)         Mercedes Anderson, PT  1/2/2019

## 2019-01-10 ENCOUNTER — HOSPITAL ENCOUNTER (OUTPATIENT)
Dept: PHYSICAL THERAPY | Facility: HOSPITAL | Age: 64
Setting detail: THERAPIES SERIES
Discharge: HOME | End: 2019-01-10

## 2019-01-10 DIAGNOSIS — R26.89 BALANCE PROBLEM: Primary | ICD-10-CM

## 2019-01-10 DIAGNOSIS — M21.372 LEFT FOOT DROP: ICD-10-CM

## 2019-01-10 DIAGNOSIS — R26.9 GAIT ABNORMALITY: ICD-10-CM

## 2019-01-10 PROCEDURE — 97110 THERAPEUTIC EXERCISES: CPT | Performed by: PHYSICAL THERAPIST

## 2019-01-10 PROCEDURE — 97116 GAIT TRAINING THERAPY: CPT | Performed by: PHYSICAL THERAPIST

## 2019-01-10 PROCEDURE — 97112 NEUROMUSCULAR REEDUCATION: CPT | Performed by: PHYSICAL THERAPIST

## 2019-01-10 NOTE — THERAPY TREATMENT NOTE
"    Outpatient Physical Therapy Neuro Treatment Note  [facility]     Patient Name: Tom Blair  : 1955  MRN: [id number]  Today's Date: 1/10/2019      Visit Date: 01/10/2019    Visit Dx:    ICD-10-CM ICD-9-CM   1. Balance problem R26.89 781.99   2. Gait abnormality R26.9 781.2   3. Left foot drop M21.372 736.79       There is no problem list on file for this patient.          PT Neuro     Row Name 01/10/19 0800             Subjective Comments    Subjective Comments  \"My balance wasn't too good yesterday.  I almost had a fall while I was carrying my grandson.\"  -MW         Precautions and Contraindications    Precautions/Limitations  fall precautions  -MW         Subjective Pain    Able to rate subjective pain?  yes  -MW      Pre-Treatment Pain Level  0  -MW      Post-Treatment Pain Level  0  -MW         Gait/Stairs Assessment/Training    18443 - Gait Training Minutes   10  -MW      Comment (Gait/Stairs)  Ambulation with toss/catch, bounce/catch ball fwd and retroambulation with mod A for 1000' with LOB up to 60% of the time.    -MW         Balance Skills Training    19147 -  PT Neuromuscular Reeducation Minutes  35  -MW      Training Strategies (Balance)  B fwd lunge to BOSU x 10, lunge with B shoulder flexion overhead x 10, B fwd lunge to BOSU with toss/catch 2# ball on/off rebounder x 10.  St. balance on rounded side of BOSU with B UE A with TRX with B shoulder abduction x 10, B alternating punching x 10 and mini-squat x 10 with min/mod A and LOB up to 50% of the time.  B TRX assist with B fwd stepping up onto/off BOSU x 10 then one foot on top of BOSU and quickly alternating x 10 with mod/max A with LOB up to 70% of the time.  Sit to stand with balls of feet on wedge x 10.  -MW        User Key  (r) = Recorded By, (t) = Taken By, (c) = Cosigned By    Initials Name Provider Type    Mrecedes Arana, PT Physical Therapist                  PT Assessment/Plan     Row Name 01/10/19 0800          PT " "Assessment    Assessment Comments  Pt. requries up to mod/max A with standing dynamic balance activities.  Pt. demonstrates L circumduction and foot drop with fatigue.  Pt. to benefit from skilled PT services to meet goals.  -MW        PT Plan    PT Plan Comments  Continue with PT services to improve gait, balance, strength, transfers and overall functional mobility.    -MW       User Key  (r) = Recorded By, (t) = Taken By, (c) = Cosigned By    Initials Name Provider Type    Mercedes Arana, PT Physical Therapist               Exercises     Row Name 01/10/19 0800             Precautions    Existing Precautions/Restrictions  fall  -MW         Subjective Comments    Subjective Comments  \"My balance wasn't too good yesterday.  I almost had a fall while I was carrying my grandson.\"  -MW         Subjective Pain    Able to rate subjective pain?  yes  -MW      Pre-Treatment Pain Level  0  -MW      Post-Treatment Pain Level  0  -MW         Total Minutes    67334 - Gait Training Minutes   10  -MW      56230 - PT Therapeutic Exercise Minutes  10  -MW      08660 -  PT Neuromuscular Reeducation Minutes  35  -MW         Exercise 1    Exercise Name 1  NuStep L6  -MW      Time 1  8 min  -MW         Exercise 2    Exercise Name 2  Quad opposite LEs  -MW      Sets 2  1  -MW      Reps 2  5  -MW        User Key  (r) = Recorded By, (t) = Taken By, (c) = Cosigned By    Initials Name Provider Type    Mercedes Arana, PT Physical Therapist                            Therapy Education  Given: Mobility training, Symptoms/condition management, Posture/body mechanics  Program: Reinforced  How Provided: Verbal  Provided to: Patient  Level of Understanding: Verbalized              Time Calculation:   Start Time: 0800   Therapy Suggested Charges     Code   Minutes Charges    54889 (CPT®) Hc Pt Neuromusc Re Education Ea 15 Min 35 2    29091 (CPT®) Hc Pt Ther Proc Ea 15 Min 10 1    79045 (CPT®) Hc Gait Training Ea 15 Min 10 1    59819 " (CPT®) Hc Pt Therapeutic Act Ea 15 Min      36724 (CPT®) Hc Pt Manual Therapy Ea 15 Min      52040 (CPT®) Hc Pt Ther Massage- Per 15 Min      56211 (CPT®) Hc Pt Iontophoresis Ea 15 Min      71077 (CPT®) Hc Pt Elec Stim Ea-Per 15 Min      70176 (CPT®) Hc Pt Ultrasound Ea 15 Min      53844 (CPT®) Hc Pt Self Care/Mgmt/Train Ea 15 Min      91244 (CPT®) Hc Pt Prosthetic (S) Train Initial Encounter, Each 15 Min      34511 (CPT®) Hc Orthotic(S) Mgmt/Train Initial Encounter, Each 15min      25788 (CPT®) Hc Pt Aquatic Therapy Ea 15 Min      37157 (CPT®) Hc Pt Orthotic(S)/Prosthetic(S) Encounter, Each 15 Min       (CPT®) Hc Pt Electrical Stim Unattended      Total  55 4        Therapy Charges for Today     Code Description Service Date Service Provider Modifiers Qty    11787099612 HC PT NEUROMUSC RE EDUCATION EA 15 MIN 1/10/2019 Mercedes Anderson, PT GP 2    39105120257 HC PT THER PROC EA 15 MIN 1/10/2019 Mercedes Anderson, PT GP 1    39481009485 HC GAIT TRAINING EA 15 MIN 1/10/2019 Mercedes Anderson, PT GP 1                    Mercedes Anderson, PT  1/10/2019

## 2019-01-14 ENCOUNTER — HOSPITAL ENCOUNTER (OUTPATIENT)
Dept: PHYSICAL THERAPY | Facility: HOSPITAL | Age: 64
Setting detail: THERAPIES SERIES
Discharge: HOME | End: 2019-01-14

## 2019-01-14 DIAGNOSIS — R26.89 BALANCE PROBLEM: Primary | ICD-10-CM

## 2019-01-14 DIAGNOSIS — R26.9 GAIT ABNORMALITY: ICD-10-CM

## 2019-01-14 DIAGNOSIS — M21.372 LEFT FOOT DROP: ICD-10-CM

## 2019-01-14 PROCEDURE — 97112 NEUROMUSCULAR REEDUCATION: CPT | Performed by: PHYSICAL THERAPIST

## 2019-01-14 PROCEDURE — 97110 THERAPEUTIC EXERCISES: CPT | Performed by: PHYSICAL THERAPIST

## 2019-01-14 NOTE — THERAPY TREATMENT NOTE
"    Outpatient Physical Therapy Neuro Treatment Note  [facility]     Patient Name: Tom Blair  : 1955  MRN: [id number]  Today's Date: 2019      Visit Date: 2019    Visit Dx:    ICD-10-CM ICD-9-CM   1. Balance problem R26.89 781.99   2. Gait abnormality R26.9 781.2   3. Left foot drop M21.372 736.79       There is no problem list on file for this patient.          PT Neuro     Row Name 19 0800             Precautions and Contraindications    Precautions/Limitations  fall precautions  -MW         Subjective Pain    Able to rate subjective pain?  yes  -MW      Pre-Treatment Pain Level  0  -MW      Post-Treatment Pain Level  0  -MW         Balance Skills Training    13737 -  PT Neuromuscular Reeducation Minutes  45  -MW      Training Strategies (Balance)  St. dynamic balance with agility ladder with one foot in each square fwd, sidestepping 30 ft x 2 with min A, one foot in every other square fwd and sidestepping 30 ft x 2 with min/mod A, both feet stepping into/out each square with min/mod A 30ft x 2.  One foot in each square and hold staggered stance with toss/catch ball 30 ft x 2 with mod/max A with LOB up to 70% of the time.  Tall kneeling fwd/bwd walking with CGA 5ft x 4.  St. balance on blue foam with one foot stepping to 10\"step and other performing knee to chest x 10 with mod progressing to min A with LOB up to 40% of the time.    -MW        User Key  (r) = Recorded By, (t) = Taken By, (c) = Cosigned By    Initials Name Provider Type    Mercedes Arana, PT Physical Therapist                  PT Assessment/Plan     Row Name 19 0800          PT Assessment    Assessment Comments  Pt. requires min to max A with LOB up to 70% of the time.  Pt. demonstrates improved B foot placement with activities with pt concentrating.  Pt. to benefit from skilled PT services to meet goals.    -MW        PT Plan    PT Plan Comments  Continue with PT services to improve gait, balance, strength, " "transfers and overall functional mobility.  -MW       User Key  (r) = Recorded By, (t) = Taken By, (c) = Cosigned By    Initials Name Provider Type    Mercedes Arana, PT Physical Therapist               Exercises     Row Name 01/14/19 0800             Precautions    Existing Precautions/Restrictions  fall  -MW         Subjective Comments    Subjective Comments  \"My hip doesn't hurt anymore.\"  -MW         Subjective Pain    Able to rate subjective pain?  yes  -MW      Pre-Treatment Pain Level  0  -MW      Post-Treatment Pain Level  0  -MW         Total Minutes    07787 - PT Therapeutic Exercise Minutes  15  -MW      58195 -  PT Neuromuscular Reeducation Minutes  45  -MW         Exercise 1    Exercise Name 1  NuStep L6  -MW      Time 1  8 min  -MW         Exercise 2    Exercise Name 2  Quad B fire hydrants  -MW      Sets 2  2  -MW      Reps 2  10  -MW        User Key  (r) = Recorded By, (t) = Taken By, (c) = Cosigned By    Initials Name Provider Type    Mercedes Arana, PT Physical Therapist                            Therapy Education  Given: Symptoms/condition management, Posture/body mechanics, HEP  Program: New  How Provided: Verbal, Demonstration, Written  Provided to: Patient  Level of Understanding: Verbalized              Time Calculation:   Start Time: 0800   Therapy Suggested Charges     Code   Minutes Charges    59720 (CPT®)  Pt Neuromusc Re Education Ea 15 Min 45 3    77524 (CPT®)  Pt Ther Proc Ea 15 Min 15 1    32897 (CPT®) Hc Gait Training Ea 15 Min      98049 (CPT®) Hc Pt Therapeutic Act Ea 15 Min      91737 (CPT®)  Pt Manual Therapy Ea 15 Min      08268 (CPT®)  Pt Ther Massage- Per 15 Min      11817 (CPT®)  Pt Iontophoresis Ea 15 Min      27577 (CPT®)  Pt Elec Stim Ea-Per 15 Min      38724 (CPT®)  Pt Ultrasound Ea 15 Min      78639 (CPT®)  Pt Self Care/Mgmt/Train Ea 15 Min      41892 (CPT®)  Pt Prosthetic (S) Train Initial Encounter, Each 15 Min      34889 (CPT®)  " Orthotic(S) Mgmt/Train Initial Encounter, Each 15min      17471 (CPT®) Hc Pt Aquatic Therapy Ea 15 Min      14522 (CPT®) Hc Pt Orthotic(S)/Prosthetic(S) Encounter, Each 15 Min       (CPT®) Hc Pt Electrical Stim Unattended      Total  60 4        Therapy Charges for Today     Code Description Service Date Service Provider Modifiers Qty    75604262071 HC PT NEUROMUSC RE EDUCATION EA 15 MIN 1/14/2019 Mercedes Anderson, PT GP 3    07805236609 HC PT THER PROC EA 15 MIN 1/14/2019 Mercedes Anderson, PT GP 1                    Mercedes Anderson, PT  1/14/2019

## 2019-01-15 ENCOUNTER — TELEPHONE (OUTPATIENT)
Dept: NEUROLOGY | Facility: CLINIC | Age: 64
End: 2019-01-15

## 2019-01-15 NOTE — TELEPHONE ENCOUNTER
Amy from [facility] called stating they need an order signed and faxed. May aakash out Kartik's name who originally signed while Neptali was out and have Neptali sign then fax it back over.    Had  sign this and faxed it back to them at: [phone]

## 2019-01-21 ENCOUNTER — TELEPHONE (OUTPATIENT)
Dept: NEUROLOGY | Facility: CLINIC | Age: 64
End: 2019-01-21

## 2019-01-21 ENCOUNTER — HOSPITAL ENCOUNTER (OUTPATIENT)
Dept: PHYSICAL THERAPY | Facility: HOSPITAL | Age: 64
Setting detail: THERAPIES SERIES
Discharge: HOME | End: 2019-01-21

## 2019-01-21 DIAGNOSIS — R26.89 BALANCE PROBLEM: Primary | ICD-10-CM

## 2019-01-21 DIAGNOSIS — R26.9 GAIT ABNORMALITY: ICD-10-CM

## 2019-01-21 PROCEDURE — 97110 THERAPEUTIC EXERCISES: CPT | Performed by: PHYSICAL THERAPIST

## 2019-01-21 PROCEDURE — 97112 NEUROMUSCULAR REEDUCATION: CPT | Performed by: PHYSICAL THERAPIST

## 2019-01-21 PROCEDURE — 97116 GAIT TRAINING THERAPY: CPT | Performed by: PHYSICAL THERAPIST

## 2019-01-21 NOTE — THERAPY TREATMENT NOTE
"    Outpatient Physical Therapy Neuro Treatment Note  [facility]     Patient Name: Tom Blair  : 1955  MRN: [id number]  Today's Date: 2019      Visit Date: 2019    Visit Dx:    ICD-10-CM ICD-9-CM   1. Balance problem R26.89 781.99   2. Gait abnormality R26.9 781.2       There is no problem list on file for this patient.          PT Neuro     Row Name 19 0800             Subjective Comments    Subjective Comments  \"I got my ankle brace on Thursday.  I've worked in it on Friday.  I'm trying to get used to it.\"  -MW         Precautions and Contraindications    Precautions/Limitations  fall precautions  -MW         Subjective Pain    Able to rate subjective pain?  yes  -MW      Pre-Treatment Pain Level  0  -MW      Post-Treatment Pain Level  0  -MW         Gait/Stairs Assessment/Training    78010 - Gait Training Minutes   15  -MW      Comment (Gait/Stairs)  Ambulation on TM @ 1.2 mph with B UE A and L toe-off brace x 5 min, 2 sets with L toe off brace and VCing to decrease L circumduction with swing phase. Ambulation following TM training with improved L hip and knee flexion with swing to decrease L circumductiona.  -MW         Balance Skills Training    60741 -  PT Neuromuscular Reeducation Minutes  15  -MW      Training Strategies (Balance)  B fwd and straddle step up onto/off 10\" step without UE A with mod A with LOB up to 50% of the time.  Standing balance with one foot on rolling stool with rolling it out/back x 10 with min A and then foot on stool perfroming hip int/ext rotation x 10 with min A with pt demosntrating B hip weakness.  -MW        User Key  (r) = Recorded By, (t) = Taken By, (c) = Cosigned By    Initials Name Provider Type    Mercedes Arana, PT Physical Therapist                  PT Assessment/Plan     Row Name 19 08          PT Assessment    Assessment Comments  Pt. requires min/mod A with standing balance activties.  Pt. requires three seated rest breaks " "secondary to B LE fatigue.  Pt. demonstrates L LE circumduction with swing on TM walking and pt demonstrates L LE weakness greater then R LE.  Pt. to benefit from skilled PT services to meet goals.  -MW        PT Plan    PT Plan Comments  Continue with PT services to improve gait, balance, strength and overall functional mobility.  -MW       User Key  (r) = Recorded By, (t) = Taken By, (c) = Cosigned By    Initials Name Provider Type    Mercedes Arana, PT Physical Therapist               Exercises     Row Name 01/21/19 0800             Subjective Comments    Subjective Comments  \"I got my ankle brace on Thursday.  I've worked in it on Friday.  I'm trying to get used to it.\"  -MW         Subjective Pain    Able to rate subjective pain?  yes  -MW      Pre-Treatment Pain Level  0  -MW      Post-Treatment Pain Level  0  -MW         Total Minutes    14750 - Gait Training Minutes   15  -MW      39307 - PT Therapeutic Exercise Minutes  24  -MW      38876 -  PT Neuromuscular Reeducation Minutes  15  -MW         Exercise 1    Exercise Name 1  NuStep L6  -MW      Time 1  8 min  -MW         Exercise 2    Exercise Name 2  Issued and performed B hip strengthening HEP, see for details  -MW      Time 2  15 min  -MW        User Key  (r) = Recorded By, (t) = Taken By, (c) = Cosigned By    Initials Name Provider Type    Mercedes Arana PT Physical Therapist                            Therapy Education  Given: Symptoms/condition management, Posture/body mechanics, Mobility training  Program: Reinforced  How Provided: Verbal  Provided to: Patient  Level of Understanding: Verbalized, Teach back education performed              Time Calculation:   Start Time: 0800   Therapy Suggested Charges     Code   Minutes Charges    39415 (CPT®) Hc Pt Neuromusc Re Education Ea 15 Min 15 1    07009 (CPT®) Hc Pt Ther Proc Ea 15 Min 24 2    17223 (CPT®) Hc Gait Training Ea 15 Min 15 1    10237 (CPT®) Hc Pt Therapeutic Act Ea 15 Min      " 44224 (CPT®) Hc Pt Manual Therapy Ea 15 Min      43826 (CPT®) Hc Pt Ther Massage- Per 15 Min      03804 (CPT®) Hc Pt Iontophoresis Ea 15 Min      56401 (CPT®) Hc Pt Elec Stim Ea-Per 15 Min      39104 (CPT®) Hc Pt Ultrasound Ea 15 Min      21029 (CPT®) Hc Pt Self Care/Mgmt/Train Ea 15 Min      52193 (CPT®) Hc Pt Prosthetic (S) Train Initial Encounter, Each 15 Min      55575 (CPT®) Hc Orthotic(S) Mgmt/Train Initial Encounter, Each 15min      57234 (CPT®) Hc Pt Aquatic Therapy Ea 15 Min      01579 (CPT®) Hc Pt Orthotic(S)/Prosthetic(S) Encounter, Each 15 Min       (CPT®) Hc Pt Electrical Stim Unattended      Total  54 4        Therapy Charges for Today     Code Description Service Date Service Provider Modifiers Qty    34000508931 HC PT NEUROMUSC RE EDUCATION EA 15 MIN 1/21/2019 Mercedes Anderson, PT GP 1    94090691060 HC PT THER PROC EA 15 MIN 1/21/2019 Mercedes Anderson, PT GP 2    20577254295 HC GAIT TRAINING EA 15 MIN 1/21/2019 Mercedes Anderson, PT GP 1                    Mercedes Anderson, PT  1/21/2019

## 2019-01-21 NOTE — TELEPHONE ENCOUNTER
----- Message from Kristy Tipton sent at 1/21/2019 12:26 PM EST -----  Contact: [phone]  Dr. Gunderson,    Pt called needing a refill on Gabapentin 100 mg capsules. Pt will be out by Friday. Also, pt's pharmacy will be faxing over a request for the RX. Please fill at [facility].    Pharmacy- [phone]

## 2019-01-23 NOTE — TELEPHONE ENCOUNTER
[facility] actually sent me over a refill request authorization form and I will leave in your incoming basket in your office. Thanks.

## 2019-01-23 NOTE — TELEPHONE ENCOUNTER
Best would be that if you can get me the actual treatment form that pharmacy is faxing for me to sign. That way he gets it on time. Thanks.

## 2019-01-29 ENCOUNTER — HOSPITAL ENCOUNTER (OUTPATIENT)
Dept: PHYSICAL THERAPY | Facility: HOSPITAL | Age: 64
Setting detail: THERAPIES SERIES
Discharge: HOME | End: 2019-01-29

## 2019-01-29 PROCEDURE — 97112 NEUROMUSCULAR REEDUCATION: CPT | Performed by: PHYSICAL THERAPIST

## 2019-01-29 PROCEDURE — 97110 THERAPEUTIC EXERCISES: CPT | Performed by: PHYSICAL THERAPIST

## 2019-01-29 NOTE — THERAPY PROGRESS REPORT/RE-CERT
"    .Outpatient Physical Therapy Neuro Re-Assessment  [facility]     Patient Name: Tom Blair  : 1955  MRN: [id number]  Today's Date: 2019      Visit Date: 2019    There is no problem list on file for this patient.       Past Medical History:   Diagnosis Date   • Diabetes mellitus (CMS/HCC)    • Hyperlipidemia         No past surgical history on file.      Visit Dx:   No diagnosis found.            PT Neuro     Row Name 19 1300             Subjective Comments    Subjective Comments  \"I've been doing the exercises you gave me every day.  I've already been walking a lot today for work.\"  -MW         Precautions and Contraindications    Precautions/Limitations  fall precautions  -MW         Subjective Pain    Able to rate subjective pain?  yes  -MW      Pre-Treatment Pain Level  0  -MW      Post-Treatment Pain Level  0  -MW         Balance Skills Training    Training Strategies (Balance)  Re-assessment completed, see for details.  Standing balance with B fwd and rotational lunge to BOSU with B shoulder flexion overhead and reaching x 10 with min A for balance and then B fwd and rotational lunge with toss/catch 2# on/off rebounder x 10 with min A for balance.  Sit to stand with R foot half on/off foam disc with emphasis on L LE push x 10 with min A for control.  One foot on rolling stool with knee ext/flexion x 10 with min A.  SLS with tapping two cones with R foot with min/mod A for balance.    -MW        User Key  (r) = Recorded By, (t) = Taken By, (c) = Cosigned By    Initials Name Provider Type    Mercedes Arana, PT Physical Therapist                  Therapy Education  Given: Symptoms/condition management, Posture/body mechanics, Mobility training  Program: Reinforced  How Provided: Verbal  Provided to: Patient  Level of Understanding: Verbalized, Demonstrated    PT OP Goals     Row Name 19 1300          PT Short Term Goals    STG Date to Achieve  01/15/19  -MW     STG 1  " Patient to improve VALLEJO balance score to >/= 43/56 to decrease client's risk of falls.  -     STG 1 Progress  Met  -MW     STG 2  Patient to perform TUG within 13 sec without LOB for improved functional mobility.  -MW     STG 2 Progress  Ongoing  -MW     STG 3  Patient to improve FGA score to >/= 15/30 to decrease client's risk of falls.  -     STG 3 Progress  Ongoing  -MW     STG 4  Pt. to be able to rosangela/doff L AFO/toe-off brace for improved gait mechanics.  -     STG 4 Progress  Met  -        Long Term Goals    LTG Date to Achieve  02/26/19  -MW     LTG 1  Patient to improve VALLEJO balance score to >/= 50/56 to decrease client's risk of falls.  -MW     LTG 1 Progress  Ongoing  -MW     LTG 2  Patient to perform TUG within 11 sec without LOB for improved functional mobility.  -MW     LTG 2 Progress  Ongoing  -MW     LTG 3  Patient to improve FGA score to >/= 24/30 to decrease client's risk of falls.  -MW     LTG 3 Progress  Ongoing  -MW     LTG 4  Patient to ambulate 10 meters without AD within 8 sec without LOB for improved gait ruby and functional mobility.  -     LTG 4 Progress  Ongoing  -     LTG 5  Pt. to be I with HEP.  -     LTG 5 Progress  Ongoing  -        Time Calculation    PT Goal Re-Cert Due Date  03/04/19  -       User Key  (r) = Recorded By, (t) = Taken By, (c) = Cosigned By    Initials Name Provider Type    Mercedes Arana, PT Physical Therapist          PT Assessment/Plan     Row Name 01/29/19 1300          PT Assessment    Functional Limitations  Decreased safety during functional activities;Impaired gait;Impaired locomotion;Limitations in community activities;Performance in work activities  -     Impairments  Balance;Endurance;Gait;Locomotion;Muscle strength;Pain;Range of motion;Sensation;Posture  -     Assessment Comments  Pt. met STG with being able to rosangela/doff L AFO.  Pt. demonstrates decreased ruby but improved VALLEJO by two points today.  Pt. continues to  "demonstrate B ankle supination with B knee bowing out decreasing standing stability.  Pt. has LOB up to 60% of the time and will benefit from continued therapy services to decrease pts risk of falls.  -MW     Please refer to paper survey for additional self-reported information  Yes  -MW     Rehab Potential  Good  -MW     Patient/caregiver participated in establishment of treatment plan and goals  Yes  -MW     Patient would benefit from skilled therapy intervention  Yes  -MW        PT Plan    PT Frequency  1x/week  -MW     Predicted Duration of Therapy Intervention (Therapy Eval)  5 visits  -MW     Planned CPT's?  PT THER PROC EA 15 MIN: 70461;PT THER ACT EA 15 MIN: 91068;PT NEUROMUSC RE-EDUCATION EA 15 MIN: 45412;PT GAIT TRAINING EA 15 MIN: 34148;PT ELECTRICAL STIM UNATTEND: ;PT ELECTRICAL STIM ATTD EA 15 MIN: 43902  -MW     PT Plan Comments  Cont with PT services to improve gait, balance, strength and overall functional mobility.  -MW       User Key  (r) = Recorded By, (t) = Taken By, (c) = Cosigned By    Initials Name Provider Type    MW Mercedes Anderson, PT Physical Therapist             Exercises     Row Name 01/29/19 1300             Subjective Comments    Subjective Comments  \"I've been doing the exercises you gave me every day.  I've already been walking a lot today for work.\"  -MW         Subjective Pain    Able to rate subjective pain?  yes  -MW      Pre-Treatment Pain Level  0  -MW      Post-Treatment Pain Level  0  -MW         Total Minutes    30961 - PT Therapeutic Exercise Minutes  10  -MW      95093 -  PT Neuromuscular Reeducation Minutes  50  -MW         Exercise 1    Exercise Name 1  Total gym DLP  -MW      Sets 1  2  -MW      Time 1  2 min  -MW      Additional Comments  emphasis on pressing through flat foot and decreasing B lateral press on foot plate  -MW         Exercise 2    Exercise Name 2  Seated stool pull  -MW      Time 2  3 min  -MW        User Key  (r) = Recorded By, (t) = Taken By, " (c) = Cosigned By    Initials Name Provider Type    MW Mercedes Anderson, PT Physical Therapist                      Outcome Measure Options: 10 Meter Walk, James Balance, Timed Up and Go (TUG)  10 Meter Walk Test Self-Selected Velocity  Self-Selected Velocity: Trial 1: 12.44 sec.  10 Meter Walk Test Fast Velocity  10 Meter Walk Fast Velocity: Trial 1: 10.47 sec.  James Balance Scale  Sitting to Standing: able to stand without using hands and stabilize independently  Standing Unsupported: able to stand safely for 2 minutes  Sitting with Back Unsupported but Feet Supported on Floor or on Stool: able to sit safely and securely for 2 minutes  Standing to Sitting: controls descent by using hands  Transfers: able to transfer safely definite need of hands  Standing Unsupported with Eyes Closed: able to stand 10 seconds safely  Standing Unsupported with Feet Together: able to place feet together independently and stand 1 minute safely  Reaching Forward with Outstretched Arm While Standing: can reach forward confidently 25 cm (10 inches)   Object From the Floor From a Standing Position: able to  object safely and easily  Turning to Look Behind Over Left and Right Shoulders While Standing: looks behind from both sides and weight shifts well  Turn 360 Degrees: able to turn 360 degrees safely but slowly  Place Alternate Foot on Step or Stool While Standing Unsupported: able to complete > 2 steps needs minimal assist  Standing Unsupported with One Foot in Front: able to place foot ahead independently and hold 30 seconds  Standing on One Leg: able to lift leg independently and hold >/equal to 3 seconds  James Total Score: 46  Timed Up and Go (TUG)  TUG Test 1: 16.01 seconds(one LOB with CGA)    Time Calculation:   Start Time: 1300   Therapy Suggested Charges     Code   Minutes Charges    68736 (CPT®) Hc Pt Neuromusc Re Education Ea 15 Min 50 3    74812 (CPT®) Hc Pt Ther Proc Ea 15 Min 10 1    55821 (CPT®) Hc Gait  Training Ea 15 Min      93673 (CPT®) Hc Pt Therapeutic Act Ea 15 Min      55382 (CPT®) Hc Pt Manual Therapy Ea 15 Min      02159 (CPT®) Hc Pt Ther Massage- Per 15 Min      03780 (CPT®) Hc Pt Iontophoresis Ea 15 Min      97877 (CPT®) Hc Pt Elec Stim Ea-Per 15 Min      20569 (CPT®) Hc Pt Ultrasound Ea 15 Min      26076 (CPT®) Hc Pt Self Care/Mgmt/Train Ea 15 Min      69945 (CPT®) Hc Pt Prosthetic (S) Train Initial Encounter, Each 15 Min      94888 (CPT®) Hc Orthotic(S) Mgmt/Train Initial Encounter, Each 15min      23825 (CPT®) Hc Pt Aquatic Therapy Ea 15 Min      28984 (CPT®) Hc Pt Orthotic(S)/Prosthetic(S) Encounter, Each 15 Min       (CPT®) Hc Pt Electrical Stim Unattended      Total  60 4        Therapy Charges for Today     Code Description Service Date Service Provider Modifiers Qty    11871512002 HC PT NEUROMUSC RE EDUCATION EA 15 MIN 1/29/2019 Mercedes Anderson, PT GP 3    39612667339 HC PT THER PROC EA 15 MIN 1/29/2019 Mercedes Anderson, PT GP 1          PT G-Codes  Outcome Measure Options: 10 Meter Walk, James Balance, Timed Up and Go (TUG)  James Total Score: 46  TUG Test 1: 16.01 seconds(one LOB with CGA)         Mercedes Anderson, PT  1/29/2019

## 2019-02-05 ENCOUNTER — HOSPITAL ENCOUNTER (OUTPATIENT)
Dept: PHYSICAL THERAPY | Facility: HOSPITAL | Age: 64
Setting detail: THERAPIES SERIES
Discharge: HOME | End: 2019-02-05

## 2019-02-05 DIAGNOSIS — R26.89 BALANCE PROBLEM: Primary | ICD-10-CM

## 2019-02-05 DIAGNOSIS — M21.372 LEFT FOOT DROP: ICD-10-CM

## 2019-02-05 DIAGNOSIS — R26.9 GAIT ABNORMALITY: ICD-10-CM

## 2019-02-05 PROCEDURE — 97112 NEUROMUSCULAR REEDUCATION: CPT | Performed by: PHYSICAL THERAPIST

## 2019-02-05 PROCEDURE — 97110 THERAPEUTIC EXERCISES: CPT | Performed by: PHYSICAL THERAPIST

## 2019-02-05 NOTE — THERAPY TREATMENT NOTE
"    Outpatient Physical Therapy Neuro Treatment Note  [facility]     Patient Name: Tom Blair  : 1955  MRN: [id number]  Today's Date: 2019      Visit Date: 2019    Visit Dx:    ICD-10-CM ICD-9-CM   1. Balance problem R26.89 781.99   2. Gait abnormality R26.9 781.2   3. Left foot drop M21.372 736.79       There is no problem list on file for this patient.          PT Neuro     Row Name 19 0845             Subjective Comments    Subjective Comments  \"I love this brace.  It does help.  When I get tired my balance is worse.\"  -MW         Subjective Pain    Able to rate subjective pain?  yes  -MW      Pre-Treatment Pain Level  0  -MW      Post-Treatment Pain Level  0  -MW         Balance Skills Training    Training Strategies (Balance)  St. balance on trampoline with reg FARIBA, B staggered with EC and B cervical rot/flex/ext x 10 with min/mod A, jumping on trampoline without UE A x 10, B ski and hip ab/adduction jump x 5 with min/mod A with LOB 20% of the time.  Tall kneeling fwd, bwd, sidestepping with CGA/min A.  B half kneeling and hold with pt unable to hold on L LE stance;  with right stance performed B cerivcal rot/flex/ext x 10 with min A.    -        User Key  (r) = Recorded By, (t) = Taken By, (c) = Cosigned By    Initials Name Provider Type    Mercedes Arana, PT Physical Therapist                  PT Assessment/Plan     Row Name 19 0845          PT Assessment    Assessment Comments  Pt. requires min/mod A with standing balance on uneven surfaces.  Pt. demonstrates core and L LE weakness.  Pt. demonstrates increased LOB with EC.  Pt. to benefit from skilled PT services to meet goals.  -        PT Plan    PT Plan Comments  Continue with PT services to meet goals and improve gait, balance, strength and overall functional mobility.  -       User Key  (r) = Recorded By, (t) = Taken By, (c) = Cosigned By    Initials Name Provider Type    Mercedes Arana, PT Physical " "Therapist               Exercises     Row Name 02/05/19 0845             Subjective Comments    Subjective Comments  \"I love this brace.  It does help.  When I get tired my balance is worse.\"  -MW         Subjective Pain    Able to rate subjective pain?  yes  -MW      Pre-Treatment Pain Level  0  -MW      Post-Treatment Pain Level  0  -MW         Total Minutes    11100 - PT Therapeutic Exercise Minutes  15  -MW      17531 -  PT Neuromuscular Reeducation Minutes  30  -MW         Exercise 1    Exercise Name 1  NuStep L6  -MW      Time 1  8 min  -MW      Additional Comments  B UE/LEs  -MW         Exercise 2    Exercise Name 2  Quad opposite UE/LE  -MW      Sets 2  1  -MW      Reps 2  10  -MW         Exercise 3    Exercise Name 3  Issued and performed mod prone and side plank  -MW      Sets 3  1  -MW      Reps 3  10  -MW        User Key  (r) = Recorded By, (t) = Taken By, (c) = Cosigned By    Initials Name Provider Type    Mercedes Arana, PT Physical Therapist                            Therapy Education  Given: Symptoms/condition management, Posture/body mechanics  Program: Reinforced  How Provided: Verbal  Provided to: Patient  Level of Understanding: Verbalized, Demonstrated              Time Calculation:   Start Time: 0845   Therapy Suggested Charges     Code   Minutes Charges    70336 (CPT®) Hc Pt Neuromusc Re Education Ea 15 Min 30 2    66123 (CPT®) Hc Pt Ther Proc Ea 15 Min 15 1    74447 (CPT®) Hc Gait Training Ea 15 Min      30616 (CPT®) Hc Pt Therapeutic Act Ea 15 Min      22164 (CPT®) Hc Pt Manual Therapy Ea 15 Min      01422 (CPT®) Hc Pt Ther Massage- Per 15 Min      46477 (CPT®) Hc Pt Iontophoresis Ea 15 Min      59240 (CPT®) Hc Pt Elec Stim Ea-Per 15 Min      28967 (CPT®) Hc Pt Ultrasound Ea 15 Min      88944 (CPT®) Hc Pt Self Care/Mgmt/Train Ea 15 Min      64680 (CPT®) Hc Pt Prosthetic (S) Train Initial Encounter, Each 15 Min      99653 (CPT®) Hc Orthotic(S) Mgmt/Train Initial Encounter, Each 15min   "    19199 (CPT®) Hc Pt Aquatic Therapy Ea 15 Min      64870 (CPT®) Hc Pt Orthotic(S)/Prosthetic(S) Encounter, Each 15 Min       (CPT®) Hc Pt Electrical Stim Unattended      Total  45 3        Therapy Charges for Today     Code Description Service Date Service Provider Modifiers Qty    49288265599 HC PT NEUROMUSC RE EDUCATION EA 15 MIN 2/5/2019 Mercedes Anderson, PT GP 2    29348622103 HC PT THER PROC EA 15 MIN 2/5/2019 Mercedes Anderson, PT GP 1                    Mercedes Anderson, PT  2/5/2019

## 2019-02-12 ENCOUNTER — HOSPITAL ENCOUNTER (OUTPATIENT)
Dept: PHYSICAL THERAPY | Facility: HOSPITAL | Age: 64
Setting detail: THERAPIES SERIES
Discharge: HOME | End: 2019-02-12

## 2019-02-12 ENCOUNTER — OFFICE VISIT (OUTPATIENT)
Dept: NEUROLOGY | Facility: CLINIC | Age: 64
End: 2019-02-12

## 2019-02-12 VITALS
DIASTOLIC BLOOD PRESSURE: 76 MMHG | WEIGHT: 257.8 LBS | HEIGHT: 78 IN | BODY MASS INDEX: 29.83 KG/M2 | SYSTOLIC BLOOD PRESSURE: 124 MMHG

## 2019-02-12 DIAGNOSIS — R26.89 BALANCE PROBLEM: Primary | ICD-10-CM

## 2019-02-12 DIAGNOSIS — R26.9 GAIT ABNORMALITY: ICD-10-CM

## 2019-02-12 DIAGNOSIS — G62.9 NEUROPATHY: Primary | ICD-10-CM

## 2019-02-12 DIAGNOSIS — M21.372 LEFT FOOT DROP: ICD-10-CM

## 2019-02-12 PROCEDURE — 99213 OFFICE O/P EST LOW 20 MIN: CPT | Performed by: PSYCHIATRY & NEUROLOGY

## 2019-02-12 PROCEDURE — 97112 NEUROMUSCULAR REEDUCATION: CPT | Performed by: PHYSICAL THERAPIST

## 2019-02-12 PROCEDURE — 97110 THERAPEUTIC EXERCISES: CPT | Performed by: PHYSICAL THERAPIST

## 2019-02-12 NOTE — THERAPY TREATMENT NOTE
"    Outpatient Physical Therapy Neuro Treatment Note  [facility]     Patient Name: Tom Blair  : 1955  MRN: [id number]  Today's Date: 2019      Visit Date: 2019    Visit Dx:    ICD-10-CM ICD-9-CM   1. Balance problem R26.89 781.99   2. Gait abnormality R26.9 781.2   3. Left foot drop M21.372 736.79       There is no problem list on file for this patient.          PT Neuro     Row Name 19 0845             Subjective Comments    Subjective Comments  \"I've been doing the exercises you gave me, especially for my core and it's getting better.\"  -MW         Subjective Pain    Able to rate subjective pain?  yes  -MW      Pre-Treatment Pain Level  0  -MW      Post-Treatment Pain Level  0  -MW         Balance Skills Training    94609 -  PT Neuromuscular Reeducation Minutes  30  -MW      Training Strategies (Balance)  St. balance on rocker board R/L and ant/post with clipping overhead and across midline x 10, 2 sets with min A for balance.  Sitting balance on large swiss ball with B LE marching, TKE, hip ab/adduction x 10, sit to stand x 10 with min A.  Tall kneeling with fwd/bwd walking with difficulty with walking bwd.  St. balance on blue foam with B UE trunk rotation across midline and overhead with 40# resistance x 10, 2 sets with min A for balance.    -MW        User Key  (r) = Recorded By, (t) = Taken By, (c) = Cosigned By    Initials Name Provider Type    Mercedes Arana, PT Physical Therapist                  PT Assessment/Plan     Row Name 19 0900          PT Assessment    Assessment Comments  Pt. requires min A with standing and sitting balance on uneven surfaces.  All activities performed without L toe off brace.  Pt. requires several seated rest breaks secondary to fatigue.  Pt. to benefit from skilled PT services to meet goals.    -MW        PT Plan    PT Plan Comments  Continue with PT services to improve gait, balance, strength, and overall functional mobility.  -MW     " "  User Key  (r) = Recorded By, (t) = Taken By, (c) = Cosigned By    Initials Name Provider Type    Mercedes Arana, PT Physical Therapist               Exercises     Row Name 02/12/19 0845             Subjective Comments    Subjective Comments  \"I've been doing the exercises you gave me, especially for my core and it's getting better.\"  -MW         Subjective Pain    Able to rate subjective pain?  yes  -MW      Pre-Treatment Pain Level  0  -MW      Post-Treatment Pain Level  0  -MW         Total Minutes    92637 - PT Therapeutic Exercise Minutes  15  -MW      49564 -  PT Neuromuscular Reeducation Minutes  30  -MW         Exercise 1    Exercise Name 1  NuStep L7  -MW      Time 1  8 min  -MW         Exercise 2    Exercise Name 2  Quad B fire hydrants  -MW      Sets 2  2  -MW      Reps 2  10  -MW         Exercise 3    Exercise Name 3  Quad B hip ext  -MW      Sets 3  2  -MW      Reps 3  10  -MW         Exercise 4    Exercise Name 4  Quad B hip circles  -MW      Sets 4  1  -MW      Reps 4  10  -MW        User Key  (r) = Recorded By, (t) = Taken By, (c) = Cosigned By    Initials Name Provider Type    Mercedes Arana, PT Physical Therapist                            Therapy Education  Given: Symptoms/condition management, Posture/body mechanics  Program: Reinforced  How Provided: Verbal  Provided to: Patient  Level of Understanding: Verbalized              Time Calculation:   Start Time: 0845   Therapy Suggested Charges     Code   Minutes Charges    98233 (CPT®) Hc Pt Neuromusc Re Education Ea 15 Min 30 2    29590 (CPT®) Hc Pt Ther Proc Ea 15 Min 15 1    29947 (CPT®) Hc Gait Training Ea 15 Min      51134 (CPT®) Hc Pt Therapeutic Act Ea 15 Min      07490 (CPT®) Hc Pt Manual Therapy Ea 15 Min      02552 (CPT®) Hc Pt Ther Massage- Per 15 Min      88969 (CPT®) Hc Pt Iontophoresis Ea 15 Min      45332 (CPT®) Hc Pt Elec Stim Ea-Per 15 Min      62491 (CPT®) Hc Pt Ultrasound Ea 15 Min      77063 (CPT®) Hc Pt Self " Care/Mgmt/Train Ea 15 Min      11214 (CPT®) Hc Pt Prosthetic (S) Train Initial Encounter, Each 15 Min      81196 (CPT®) Hc Orthotic(S) Mgmt/Train Initial Encounter, Each 15min      31115 (CPT®) Hc Pt Aquatic Therapy Ea 15 Min      34369 (CPT®) Hc Pt Orthotic(S)/Prosthetic(S) Encounter, Each 15 Min       (CPT®) Hc Pt Electrical Stim Unattended      Total  45 3        Therapy Charges for Today     Code Description Service Date Service Provider Modifiers Qty    92997298387 HC PT NEUROMUSC RE EDUCATION EA 15 MIN 2/12/2019 Mercedes Anderson, PT GP 2    14893526686 HC PT THER PROC EA 15 MIN 2/12/2019 Mercedes Anderson, PT GP 1                    Mercedes Anderson, PT  2/12/2019

## 2019-02-12 NOTE — PROGRESS NOTES
Subjective:    CC: Tom Blair is in clinic today for follow up for  bilateral leg weakness and diabetic neuropathy.    HPI:  He is in clinic for regular follow-up.  Since her last visit, he continues to take gabapentin 200 mg 3 times a day which helps symptomatically.  He is also getting physical therapy regularly.  He now has a new left the AFO and that seems to be working better than the older one.  He hasn't had the EMG/nerve conduction study as yet and is requesting if it can be scheduled at  due to insurance issues.    The following portions of the patient's history were reviewed and updated as of 02/12/2019: allergies, social history and problem list.       Current Outpatient Medications:   •  aspirin 81 MG tablet, Take  by mouth Daily., Disp: , Rfl:   •  atorvastatin (LIPITOR) 10 MG tablet, , Disp: , Rfl:   •  BYDUREON BCISE 2 MG/0.85ML auto-injector injection, , Disp: , Rfl:   •  Cholecalciferol (VITAMIN D) 1000 units tablet, Take  by mouth., Disp: , Rfl:   •  COMBIGAN 0.2-0.5 % ophthalmic solution, , Disp: , Rfl:   •  enalapril (VASOTEC) 5 MG tablet, , Disp: , Rfl:   •  FORTEO 600 MCG/2.4ML injection, , Disp: , Rfl:   •  gabapentin (NEURONTIN) 100 MG capsule, Take 2 capsules by mouth 3 (Three) Times a Day., Disp: 180 capsule, Rfl: 1  •  INVOKANA 300 MG tablet, , Disp: , Rfl:   •  metFORMIN (GLUMETZA) 500 MG (MOD) 24 hr tablet, Take 500 mg by mouth 2 (Two) Times a Day., Disp: , Rfl:   •  metoprolol tartrate (LOPRESSOR) 25 MG tablet, Take  by mouth Daily (Monday-Friday)., Disp: , Rfl:   •  sertraline (ZOLOFT) 100 MG tablet, Take  by mouth Daily., Disp: , Rfl:   •  ULTICARE MINI PEN NEEDLES 31G X 6 MM misc, , Disp: , Rfl:    Past Medical History:   Diagnosis Date   • Diabetes mellitus (CMS/HCC)    • Hyperlipidemia       History reviewed. No pertinent surgical history.   Family History   Problem Relation Age of Onset   • Alzheimer's disease Mother    • Dementia Mother    • Diabetes Mother    • Cancer Father   "  • Diabetes Father    • Heart disease Father         Review of Systems   Constitutional: Positive for unexpected weight gain and unexpected weight loss.     Objective:    /76   Ht 200.7 cm (79\")   Wt 117 kg (257 lb 12.8 oz)   BMI 29.04 kg/m²     Neurology Exam:    General apperance: NAD.     Mental status: Alert, awake and oriented to time place and person.    Recent and Remote memory: Can recall 3/3 objects at 5 minutes. Can recall historical events.     Attention span and Concentration: Serial 7s: Normal.     Fund of knowledge:  Normal.     Language and Speech: No aphasia or dysarthria.    Naming , Repitition and Comprehension:  Can name objects, repeat a sentence and follow commands. Speech is clear and fluent with good repetition, comprehension, and naming.    Cranial Nerves:   CN II: Visual fields are full. Intact. Fundi - Normal, No papillederma, Pupils - CARO  CN III, IV and VI: Extraocular movements are intact. Normal saccades.   CN V: Facial sensation is intact.   CN VII: Muscles of facial expression reveal no asymmetry. Intact.   CN VIII: Hearing is intact. Whispered voice intact.   CN IX and X: Palate elevates symmetrically. Intact  CN XI: Shoulder shrug is intact.   CN XII: Tongue is midline without evidence of atrophy or fasciculation.     Motor:  Right UE muscle strength 5/5. Normal tone.     Left UE muscle strength 5/5. Normal tone.      Right LE muscle strength5/5 except the mild dorsiflexion weakness.    Left LE muscle strength 5/5 except mild dorsiflexion weakness.    Sensory: Normal light touch, vibration and pinprick sensation bilaterally.    DTRs: 2+ bilaterally in upper extremities and 2+ right knee 3+ left knee and 1+ bilateral ankles.     Babinski: Positive bilaterally.    Co-ordination: Normal finger-to-nose, heel to shin B/L.    Rhomberg: Negative.    Gait: Normal.    Cardiovascular: Regular rate and rhythm without murmur, gallop or rub.    Assessment and Plan:  1. " Neuropathy  Diabetic neuropathy in a patient with long-term history of diabetes with left footdrop.  He continues to wear the AFO on the left and with new AFO, the balance has improved.  He is continuing with physical therapy as per schedule.  Gabapentin 200 mg 3 times a day has helped significantly symptomatically. He still has not had EMG/nerve conduction study for further evaluation.  He was found to have brisk left knee  reflex along with positive Babinski bilaterally and fasciculations noted on calf muscles bilaterally.  I'll be scheduling for EMG/nerve conduction study at  as he could not have it done at [facility] due to insurance issues.  I'll see him back after EMG is performed.       I spent 15 minutes face to face with the patient and spent 10 minutes of this time counseling and discussing about taking medication regularly, possible side effects with medication use, importance of good sleep hygiene, good hydration and regular exercise.    No Follow-up on file.

## 2019-02-13 ENCOUNTER — TELEPHONE (OUTPATIENT)
Dept: NEUROLOGY | Facility: CLINIC | Age: 64
End: 2019-02-13

## 2019-02-13 NOTE — TELEPHONE ENCOUNTER
Left voicemail    -Pt scheduled for MRI at [facility] on Monday, Feb 18 at 9:00 am    Address is [address] [place] [address], [address]  p [phone]

## 2019-02-26 ENCOUNTER — HOSPITAL ENCOUNTER (OUTPATIENT)
Dept: PHYSICAL THERAPY | Facility: HOSPITAL | Age: 64
Setting detail: THERAPIES SERIES
Discharge: HOME | End: 2019-02-26

## 2019-02-26 DIAGNOSIS — R26.89 BALANCE PROBLEM: Primary | ICD-10-CM

## 2019-02-26 DIAGNOSIS — M21.372 LEFT FOOT DROP: ICD-10-CM

## 2019-02-26 DIAGNOSIS — R26.9 GAIT ABNORMALITY: ICD-10-CM

## 2019-02-26 PROCEDURE — 97116 GAIT TRAINING THERAPY: CPT | Performed by: PHYSICAL THERAPIST

## 2019-02-26 PROCEDURE — 97112 NEUROMUSCULAR REEDUCATION: CPT | Performed by: PHYSICAL THERAPIST

## 2019-02-26 PROCEDURE — 97110 THERAPEUTIC EXERCISES: CPT | Performed by: PHYSICAL THERAPIST

## 2019-02-26 NOTE — THERAPY PROGRESS REPORT/RE-CERT
"    .Outpatient Physical Therapy Neuro Re-Assessment  [facility]     Patient Name: Tom Blair  : 1955  MRN: [id number]  Today's Date: 2019      Visit Date: 2019    There is no problem list on file for this patient.       Past Medical History:   Diagnosis Date   • Diabetes mellitus (CMS/HCC)    • Hyperlipidemia         No past surgical history on file.      Visit Dx:     ICD-10-CM ICD-9-CM   1. Balance problem R26.89 781.99   2. Gait abnormality R26.9 781.2   3. Left foot drop M21.372 736.79               PT Neuro     Row Name 19 0845             Subjective Comments    Subjective Comments  \"I've had a couple of stumbles but no falls.\"  -MW         Subjective Pain    Able to rate subjective pain?  yes  -MW      Pre-Treatment Pain Level  0  -MW      Post-Treatment Pain Level  0  -MW         Gait/Stairs Assessment/Training    31715 - Gait Training Minutes   15  -MW      Comment (Gait/Stairs)  Ambulation 1000' with toss/catch, bounce/catch ball with min A, retroambulation with toss/catch ball, EC ambulation with min/mod A with LOB up to 40% of the time.  -MW         Balance Skills Training    Training Strategies (Balance)  Re-assessment completed, see for details.  -MW        User Key  (r) = Recorded By, (t) = Taken By, (c) = Cosigned By    Initials Name Provider Type    Mercedes Arana, PT Physical Therapist                  Therapy Education  Education Details: educated pt that he can begin gait with treking poles outside when it gets warmer in close to home grassy areas to see if he can return to hiking.  Given: HEP  Program: New  How Provided: Verbal, Demonstration, Written  Provided to: Patient  Level of Understanding: Verbalized, Demonstrated    PT OP Goals     Row Name 19 0845          PT Short Term Goals    STG Date to Achieve  19  -MW     STG 1  Patient to improve VALLEJO balance score to >/= 43/56 to decrease client's risk of falls.  -MW     STG 1 Progress  Met  -MW     " STG 2  Patient to perform TUG within 13 sec without LOB for improved functional mobility.  -MW     STG 2 Progress  Met  -     STG 3  Patient to improve FGA score to >/= 15/30 to decrease client's risk of falls.  -MW     STG 3 Progress  Ongoing  -     STG 4  Pt. to be able to rosangela/doff L AFO/toe-off brace for improved gait mechanics.  -     STG 4 Progress  Met  -        Long Term Goals    LTG Date to Achieve  04/09/19  -MW     LTG 1  Patient to improve VALLEJO balance score to >/= 50/56 to decrease client's risk of falls.  -MW     LTG 1 Progress  Ongoing  -MW     LTG 2  Patient to perform TUG within 11 sec without LOB for improved functional mobility.  -MW     LTG 2 Progress  Ongoing  -MW     LTG 3  Patient to improve FGA score to >/= 24/30 to decrease client's risk of falls.  -MW     LTG 3 Progress  Ongoing  -MW     LTG 4  Patient to ambulate 10 meters without AD within 8 sec without LOB for improved gait ruby and functional mobility.  -     LTG 4 Progress  Ongoing  -     LTG 5  Pt. to be I with HEP.  -     LTG 5 Progress  Ongoing  -        Time Calculation    PT Goal Re-Cert Due Date  05/27/19  -       User Key  (r) = Recorded By, (t) = Taken By, (c) = Cosigned By    Initials Name Provider Type    Mercedes Arana, PT Physical Therapist          PT Assessment/Plan     Row Name 02/26/19 0845          PT Assessment    Functional Limitations  Decreased safety during functional activities;Impaired gait;Impaired locomotion;Limitations in community activities;Performance in work activities  -     Impairments  Balance;Endurance;Gait;Locomotion;Muscle strength;Pain;Range of motion;Sensation;Posture  -     Assessment Comments  Pt. met STG for TUG today.  Pt. demonstrates decreased B LE control/placement with gait with pt staggering and LOB up to 40% of the time with distracted gait.  Pt. looses balance and falls into the wall occasionally.  Pt. to benefit from skilled PT services to improve gait,  "balance, strength and overall functional mobility.  Requesting additional goals to continue with meet goals.  -MW     Please refer to paper survey for additional self-reported information  Yes  -MW     Rehab Potential  Good  -MW     Patient/caregiver participated in establishment of treatment plan and goals  Yes  -MW     Patient would benefit from skilled therapy intervention  Yes  -MW        PT Plan    PT Frequency  1x/week  -MW     Predicted Duration of Therapy Intervention (Therapy Eval)  6 visits  -MW     Planned CPT's?  PT THER PROC EA 15 MIN: 71939;PT THER ACT EA 15 MIN: 69688;PT NEUROMUSC RE-EDUCATION EA 15 MIN: 19860;PT GAIT TRAINING EA 15 MIN: 68445  -MW     PT Plan Comments  Continue with PT services to improve gait, balance, strength, and overall functional mobility.  -MW       User Key  (r) = Recorded By, (t) = Taken By, (c) = Cosigned By    Initials Name Provider Type    Mercedes Arana, PT Physical Therapist             Exercises     Row Name 02/26/19 0845             Subjective Comments    Subjective Comments  \"I've had a couple of stumbles but no falls.\"  -MW         Subjective Pain    Able to rate subjective pain?  yes  -MW      Pre-Treatment Pain Level  0  -MW      Post-Treatment Pain Level  0  -MW         Total Minutes    23092 - Gait Training Minutes   15  -MW      14491 - PT Therapeutic Exercise Minutes  15  -MW      90054 -  PT Neuromuscular Reeducation Minutes  15  -MW         Exercise 1    Exercise Name 1  DLP total gym  -MW      Sets 1  2  -MW      Time 1  2 min  -MW         Exercise 2    Exercise Name 2  B gastroc/soleus stretch in standing for HEP, see for details.  Heel cord stretch on total gym  -MW      Sets 2  2  -MW      Time 2  1 min  -MW         Exercise 3    Exercise Name 3  B heel raises on total gym  -MW      Sets 3  1  -MW      Reps 3  10  -MW        User Key  (r) = Recorded By, (t) = Taken By, (c) = Cosigned By    Initials Name Provider Type    Mercedes Arana, PT " Physical Therapist                      Outcome Measure Options: 10 Meter Walk, FGA (Functional Gait Assessment), Timed Up and Go (TUG)  10 Meter Walk Test Self-Selected Velocity  Self-Selected Velocity: Trial 1: 12.2 sec.  10 Meter Walk Test Fast Velocity  10 Meter Walk Fast Velocity: Trial 1: 9.37 sec.  Functional Gait Assessment (FGA)  Gait Level Surface: Mild Impairment  Change in Gait Speed: Mild Impairment  Gait with Horizontal Head Turns: Moderate Impairment  Gait with Vertical Head Turns: Moderate Impairment  Gait and Pivot Turn: Mild Impairment  Step Over Obstacle: Moderate Impairment  Gait with Narrow Base of Support: Severe Impairment  Gait with Eyes Closed: Severe Impairment  Ambulating Backwards: Moderate Impairment  Steps: Mild Impairment  FGA Total Score: 12  Timed Up and Go (TUG)  TUG Test 1: 12.27 seconds    Time Calculation:   Start Time: 0845   Therapy Suggested Charges     Code   Minutes Charges    74514 (CPT®) Hc Pt Neuromusc Re Education Ea 15 Min 15 1    79914 (CPT®) Hc Pt Ther Proc Ea 15 Min 15 1    77027 (CPT®) Hc Gait Training Ea 15 Min 15 1    08363 (CPT®) Hc Pt Therapeutic Act Ea 15 Min      03362 (CPT®) Hc Pt Manual Therapy Ea 15 Min      12716 (CPT®) Hc Pt Ther Massage- Per 15 Min      31289 (CPT®) Hc Pt Iontophoresis Ea 15 Min      22557 (CPT®) Hc Pt Elec Stim Ea-Per 15 Min      36276 (CPT®) Hc Pt Ultrasound Ea 15 Min      78860 (CPT®) Hc Pt Self Care/Mgmt/Train Ea 15 Min      88245 (CPT®) Hc Pt Prosthetic (S) Train Initial Encounter, Each 15 Min      86698 (CPT®) Hc Orthotic(S) Mgmt/Train Initial Encounter, Each 15min      58254 (CPT®) Hc Pt Aquatic Therapy Ea 15 Min      19667 (CPT®) Hc Pt Orthotic(S)/Prosthetic(S) Encounter, Each 15 Min       (CPT®) Hc Pt Electrical Stim Unattended      Total  45 3        Therapy Charges for Today     Code Description Service Date Service Provider Modifiers Qty    08766539227 HC PT NEUROMUSC RE EDUCATION EA 15 MIN 2/26/2019 Mercedes Anderson,  PT GP 1    [id number]  PT THER PROC EA 15 MIN 2/26/2019 Mercedes Anderson, PT GP 1    83388045004 HC GAIT TRAINING EA 15 MIN 2/26/2019 Mercedes Anderson, PT GP 1          PT G-Codes  Outcome Measure Options: 10 Meter Walk, FGA (Functional Gait Assessment), Timed Up and Go (TUG)  FGA Total Score: 12  TUG Test 1: 12.27 seconds         Mercedes Anderson, PT  2/26/2019

## 2019-03-05 ENCOUNTER — HOSPITAL ENCOUNTER (OUTPATIENT)
Dept: PHYSICAL THERAPY | Facility: HOSPITAL | Age: 64
Setting detail: THERAPIES SERIES
Discharge: HOME | End: 2019-03-05

## 2019-03-05 DIAGNOSIS — R26.9 GAIT ABNORMALITY: ICD-10-CM

## 2019-03-05 DIAGNOSIS — R26.89 BALANCE PROBLEM: Primary | ICD-10-CM

## 2019-03-05 PROCEDURE — 97112 NEUROMUSCULAR REEDUCATION: CPT | Performed by: PHYSICAL THERAPIST

## 2019-03-05 PROCEDURE — 97110 THERAPEUTIC EXERCISES: CPT | Performed by: PHYSICAL THERAPIST

## 2019-03-05 NOTE — THERAPY TREATMENT NOTE
"    Outpatient Physical Therapy Neuro Treatment Note  [facility]     Patient Name: Tom Blair  : 1955  MRN: [id number]  Today's Date: 3/5/2019      Visit Date: 2019    Visit Dx:    ICD-10-CM ICD-9-CM   1. Balance problem R26.89 781.99   2. Gait abnormality R26.9 781.2       There is no problem list on file for this patient.          PT Neuro     Row Name 19 8924             Subjective Comments    Subjective Comments  \"I had to walk a lot for my job last Thursday and I couldn't keep up with who I was walking with.  I was really sore after that.\"  -MW         Subjective Pain    Able to rate subjective pain?  yes  -MW      Pre-Treatment Pain Level  0  -MW      Post-Treatment Pain Level  0  -MW         Balance Skills Training    40325 -  PT Neuromuscular Reeducation Minutes  37  -MW      Training Strategies (Balance)  St. balance with B fwd alternating lunge to BOSU x 10 with min/mod A, hold lunge to BOSU with B shoulder flexion overhead x 10, B trunk rotation x 10 with min/mod A for balance. One foot on blue foam and other on slider with sliding into post lunge x 10, side lunge x 10 and drawing B circles x 10 with min/mod A for L LE balance with LOB up to 50% of the time.  Quad B hip ext x 10, 2 sets with with min A.  B half kneeling and hold with EO and EC up to 10 sec with CGA.  Tall kneeling walking fwd/bwd with CGA x 1 min.  Sit to stand from elevated mat with R LE half on/off blue foam disc x 10 without UE A with LOB 20% of the time posteriorly.  -MW        User Key  (r) = Recorded By, (t) = Taken By, (c) = Cosigned By    Initials Name Provider Type    Mercedes Arana, PT Physical Therapist                  PT Assessment/Plan     Row Name 19 4519          PT Assessment    Assessment Comments  Pt. requires min/mod A with standing dynamic balance activities.  Pt. continues to demonstrate core and B LE weakness with L LE more unstable and weak compaired to his R LE.  Pt. to benefit " "from skilled PT services to improve functional mobility and stability.  -MW        PT Plan    PT Plan Comments  Continue with PT services to improve gait, balance, strength, transfers, and overall functional mobility.  -MW       User Key  (r) = Recorded By, (t) = Taken By, (c) = Cosigned By    Initials Name Provider Type    Mercedes Arana, PT Physical Therapist               Exercises     Row Name 03/05/19 0845             Subjective Comments    Subjective Comments  \"I had to walk a lot for my job last Thursday and I couldn't keep up with who I was walking with.  I was really sore after that.\"  -MW         Subjective Pain    Able to rate subjective pain?  yes  -MW      Pre-Treatment Pain Level  0  -MW      Post-Treatment Pain Level  0  -MW         Total Minutes    01971 - PT Therapeutic Exercise Minutes  8  -MW      05000 -  PT Neuromuscular Reeducation Minutes  37  -MW         Exercise 1    Exercise Name 1  NuStep L6  -MW      Time 1  8 min  -MW      Additional Comments  B UE/LEs  -MW        User Key  (r) = Recorded By, (t) = Taken By, (c) = Cosigned By    Initials Name Provider Type    Mercedes Arana, PT Physical Therapist                            Therapy Education  Given: Symptoms/condition management, Posture/body mechanics  Program: Reinforced  How Provided: Verbal  Provided to: Patient  Level of Understanding: Demonstrated, Verbalized              Time Calculation:   Start Time: 0845   Therapy Suggested Charges     Code   Minutes Charges    99438 (CPT®) Hc Pt Neuromusc Re Education Ea 15 Min 37 2    39232 (CPT®) Hc Pt Ther Proc Ea 15 Min 8 1    58054 (CPT®) Hc Gait Training Ea 15 Min      18754 (CPT®) Hc Pt Therapeutic Act Ea 15 Min      38653 (CPT®) Hc Pt Manual Therapy Ea 15 Min      35301 (CPT®) Hc Pt Ther Massage- Per 15 Min      30040 (CPT®) Hc Pt Iontophoresis Ea 15 Min      40028 (CPT®) Hc Pt Elec Stim Ea-Per 15 Min      19337 (CPT®) Hc Pt Ultrasound Ea 15 Min      95688 (CPT®) Hc Pt Self " Care/Mgmt/Train Ea 15 Min      87640 (CPT®) Hc Pt Prosthetic (S) Train Initial Encounter, Each 15 Min      42331 (CPT®) Hc Orthotic(S) Mgmt/Train Initial Encounter, Each 15min      31220 (CPT®) Hc Pt Aquatic Therapy Ea 15 Min      33521 (CPT®) Hc Pt Orthotic(S)/Prosthetic(S) Encounter, Each 15 Min       (CPT®) Hc Pt Electrical Stim Unattended      Total  45 3        Therapy Charges for Today     Code Description Service Date Service Provider Modifiers Qty    47736280789 HC PT NEUROMUSC RE EDUCATION EA 15 MIN 3/5/2019 Mercedes Anderson, PT GP 2    33391949777 HC PT THER PROC EA 15 MIN 3/5/2019 Mercedes Anderson, PT GP 1                    Mercedes Anderson, PT  3/5/2019

## 2019-03-12 ENCOUNTER — HOSPITAL ENCOUNTER (OUTPATIENT)
Dept: PHYSICAL THERAPY | Facility: HOSPITAL | Age: 64
Setting detail: THERAPIES SERIES
Discharge: HOME | End: 2019-03-12

## 2019-03-12 DIAGNOSIS — R26.89 BALANCE PROBLEM: Primary | ICD-10-CM

## 2019-03-12 DIAGNOSIS — R26.9 GAIT ABNORMALITY: ICD-10-CM

## 2019-03-12 PROCEDURE — 97112 NEUROMUSCULAR REEDUCATION: CPT | Performed by: PHYSICAL THERAPIST

## 2019-03-12 PROCEDURE — 97110 THERAPEUTIC EXERCISES: CPT | Performed by: PHYSICAL THERAPIST

## 2019-03-12 NOTE — THERAPY TREATMENT NOTE
"    Outpatient Physical Therapy Neuro Treatment Note  [facility]     Patient Name: Tom Blair  : 1955  MRN: [id number]  Today's Date: 3/12/2019      Visit Date: 2019    Visit Dx:    ICD-10-CM ICD-9-CM   1. Balance problem R26.89 781.99   2. Gait abnormality R26.9 781.2       There is no problem list on file for this patient.          PT Neuro     Row Name 19 0845             Subjective Comments    Subjective Comments  \"I've been doing the exercise machines at the gym and it's going well.\"  -MW         Subjective Pain    Able to rate subjective pain?  yes  -MW      Pre-Treatment Pain Level  0  -MW      Post-Treatment Pain Level  0  -MW         Balance Skills Training    Training Strategies (Balance)  St. balance holding tandem along balance beam with LE behind stepping fwd and back x 10 with min A and LOB up to 60% of the time.  Fwd walking along balance beam and full turns B directions with min A and pt having to grab the parallel bars to maintain balance.  B sidestepping along balance beam with min A and then holding sideways position on beam with alternating tapping cone in front x 10 with min A and VCing on wt shift.  One foot on 6\" step and other performing hip abduction x 10, 2 sets with min A .  -        User Key  (r) = Recorded By, (t) = Taken By, (c) = Cosigned By    Initials Name Provider Type     Mercedes Anderson, PT Physical Therapist                  PT Assessment/Plan     Row Name 19 0845          PT Assessment    Assessment Comments  Pt. requires min A and demonstrates LOB into parallel bars 60% of the time with VCing to press through L LE for greater stability.  Pt. to benefit from skilled PT services to meet goals.  Pt. continues to demonstrate instability with B ankle supination.  -        PT Plan    PT Plan Comments  Continue with PT services to improve gait, balance, strength and overall functional mobility.  -       User Key  (r) = Recorded By, (t) = Taken " "By, (c) = Cosigned By    Initials Name Provider Type    Mercedes Arana, PT Physical Therapist               Exercises     Row Name 03/12/19 0845             Subjective Comments    Subjective Comments  \"I've been doing the exercise machines at the gym and it's going well.\"  -MW         Subjective Pain    Able to rate subjective pain?  yes  -MW      Pre-Treatment Pain Level  0  -MW      Post-Treatment Pain Level  0  -MW         Total Minutes    03788 - PT Therapeutic Exercise Minutes  8  -MW      65826 -  PT Neuromuscular Reeducation Minutes  37  -MW         Exercise 1    Exercise Name 1  NuStep L7  -MW      Time 1  8 min  -MW      Additional Comments  B UE/LEs  -MW        User Key  (r) = Recorded By, (t) = Taken By, (c) = Cosigned By    Initials Name Provider Type    Mercedes Arana, PT Physical Therapist                            Therapy Education  Given: Symptoms/condition management, Posture/body mechanics  Program: Reinforced  How Provided: Verbal  Provided to: Patient  Level of Understanding: Verbalized, Demonstrated              Time Calculation:   Start Time: 0845   Therapy Suggested Charges     Code   Minutes Charges    35747 (CPT®) Hc Pt Neuromusc Re Education Ea 15 Min 37 2    44691 (CPT®) Hc Pt Ther Proc Ea 15 Min 8 1    54184 (CPT®) Hc Gait Training Ea 15 Min      83855 (CPT®) Hc Pt Therapeutic Act Ea 15 Min      28949 (CPT®) Hc Pt Manual Therapy Ea 15 Min      87907 (CPT®) Hc Pt Ther Massage- Per 15 Min      84215 (CPT®) Hc Pt Iontophoresis Ea 15 Min      56351 (CPT®) Hc Pt Elec Stim Ea-Per 15 Min      43148 (CPT®) Hc Pt Ultrasound Ea 15 Min      01162 (CPT®) Hc Pt Self Care/Mgmt/Train Ea 15 Min      31905 (CPT®) Hc Pt Prosthetic (S) Train Initial Encounter, Each 15 Min      61853 (CPT®) Hc Orthotic(S) Mgmt/Train Initial Encounter, Each 15min      58591 (CPT®) Hc Pt Aquatic Therapy Ea 15 Min      92057 (CPT®) Hc Pt Orthotic(S)/Prosthetic(S) Encounter, Each 15 Min       (CPT®) Hc Pt " Electrical Stim Unattended      Total  45 3        Therapy Charges for Today     Code Description Service Date Service Provider Modifiers Qty    85042391221 HC PT NEUROMUSC RE EDUCATION EA 15 MIN 3/12/2019 Mercedes Anderson, PT GP 2    04194761224 HC PT THER PROC EA 15 MIN 3/12/2019 Mercedes Anderson, PT GP 1                    Mercedes Anderson, PT  3/12/2019

## 2019-03-19 ENCOUNTER — HOSPITAL ENCOUNTER (OUTPATIENT)
Dept: PHYSICAL THERAPY | Facility: HOSPITAL | Age: 64
Setting detail: THERAPIES SERIES
Discharge: HOME | End: 2019-03-19

## 2019-03-19 DIAGNOSIS — M21.372 LEFT FOOT DROP: ICD-10-CM

## 2019-03-19 DIAGNOSIS — R26.9 GAIT ABNORMALITY: ICD-10-CM

## 2019-03-19 DIAGNOSIS — R26.89 BALANCE PROBLEM: Primary | ICD-10-CM

## 2019-03-19 PROCEDURE — 97112 NEUROMUSCULAR REEDUCATION: CPT | Performed by: PHYSICAL THERAPIST

## 2019-03-19 PROCEDURE — 97110 THERAPEUTIC EXERCISES: CPT | Performed by: PHYSICAL THERAPIST

## 2019-03-19 NOTE — THERAPY TREATMENT NOTE
"    Outpatient Physical Therapy Neuro Treatment Note  [facility]     Patient Name: Tom Blair  : 1955  MRN: [id number]  Today's Date: 3/19/2019      Visit Date: 2019    Visit Dx:    ICD-10-CM ICD-9-CM   1. Balance problem R26.89 781.99   2. Gait abnormality R26.9 781.2   3. Left foot drop M21.372 736.79       There is no problem list on file for this patient.          PT Neuro     Row Name 19 0840             Subjective Comments    Subjective Comments  \"I've been working out at the gym and doing the machines and it's helping.\"  -MW         Subjective Pain    Able to rate subjective pain?  yes  -MW      Pre-Treatment Pain Level  0  -MW      Post-Treatment Pain Level  0  -MW         Balance Skills Training    Training Strategies (Balance)  St. balance with with single LE stepping to 10\" step with other foot tapping to cone on top of 14\" box x 10, 2 sets without UE A with each LE with min/mod A for balance.  One LE on 10\" step and other performing knee flexion x 10, 2 sets without UE A with min/mod A with LOB up to 60% of the time.  Holding B half kneeling with EC with B cerivcal rot/flex/ext x 10 with min/mod A.  Alternating half kneeling x 10 with min/mod A.  Fwd and bwd tall kneeling walking with CGA with increased difficulty going bwd.    -        User Key  (r) = Recorded By, (t) = Taken By, (c) = Cosigned By    Initials Name Provider Type    Mercedes Arana, PT Physical Therapist                  PT Assessment/Plan     Row Name 19 0840          PT Assessment    Assessment Comments  Pt. continues to demonstrate decreased L hip/core stability and LOB up to 60% of the time with standing dynamic balance activities.  Pt. to benefit from skilled PT services to improve gait, balance, strength and overall functional mobility.  -        PT Plan    PT Plan Comments  Continue with PT services to improve gait, balance, strength and overall functional mobility.  -       User Key  (r) = " "Recorded By, (t) = Taken By, (c) = Cosigned By    Initials Name Provider Type    Mercedes Arana, PT Physical Therapist             Exercises     Row Name 03/19/19 0840             Subjective Comments    Subjective Comments  \"I've been working out at the gym and doing the machines and it's helping.\"  -MW         Subjective Pain    Able to rate subjective pain?  yes  -MW      Pre-Treatment Pain Level  0  -MW      Post-Treatment Pain Level  0  -MW         Total Minutes    63823 - PT Therapeutic Exercise Minutes  15  -MW      46586 -  PT Neuromuscular Reeducation Minutes  40  -MW         Exercise 1    Exercise Name 1  NuStep L7  -MW      Time 1  8 min  -MW      Additional Comments  B UE/LEs  -MW         Exercise 2    Exercise Name 2  Quad B hip extension  -MW      Sets 2  2  -MW      Reps 2  10  -MW         Exercise 3    Exercise Name 3  Quad B hip circles clockwise and counter clockwise  -MW      Sets 3  1  -MW      Reps 3  10  -MW        User Key  (r) = Recorded By, (t) = Taken By, (c) = Cosigned By    Initials Name Provider Type    Mercedes Arana PT Physical Therapist                          Therapy Education  Education Details: reinforced pts current HEP with adjusting his technique to ensure proper performance  Given: HEP  Program: New, Reinforced  How Provided: Verbal, Demonstration, Written  Provided to: Patient  Level of Understanding: Verbalized, Demonstrated              Time Calculation:   Start Time: 0840   Therapy Charges for Today     Code Description Service Date Service Provider Modifiers Qty    [id number]  PT NEUROMUSC RE EDUCATION EA 15 MIN 3/19/2019 Mercedes Anderson, PT GP 3    [id number]  PT THER PROC EA 15 MIN 3/19/2019 Mercedes Anderson, PT GP 1                    Mercedes Anderson, PT  3/19/2019     "

## 2019-03-26 ENCOUNTER — APPOINTMENT (OUTPATIENT)
Dept: PHYSICAL THERAPY | Facility: HOSPITAL | Age: 64
End: 2019-03-26

## 2019-04-01 ENCOUNTER — HOSPITAL ENCOUNTER (OUTPATIENT)
Dept: PHYSICAL THERAPY | Facility: HOSPITAL | Age: 64
Setting detail: THERAPIES SERIES
Discharge: HOME | End: 2019-04-01

## 2019-04-01 DIAGNOSIS — M21.372 LEFT FOOT DROP: ICD-10-CM

## 2019-04-01 DIAGNOSIS — R26.89 BALANCE PROBLEM: Primary | ICD-10-CM

## 2019-04-01 DIAGNOSIS — R26.9 GAIT ABNORMALITY: ICD-10-CM

## 2019-04-01 PROCEDURE — 97110 THERAPEUTIC EXERCISES: CPT | Performed by: PHYSICAL THERAPIST

## 2019-04-01 PROCEDURE — 97112 NEUROMUSCULAR REEDUCATION: CPT | Performed by: PHYSICAL THERAPIST

## 2019-04-01 NOTE — THERAPY PROGRESS REPORT/RE-CERT
"    Outpatient Physical Therapy Neuro Progress Note  [facility]     Patient Name: Tom Blair  : 1955  MRN: [id number]  Today's Date: 2019      Visit Date: 2019    Visit Dx:    ICD-10-CM ICD-9-CM   1. Balance problem R26.89 781.99   2. Gait abnormality R26.9 781.2   3. Left foot drop M21.372 736.79       There is no problem list on file for this patient.          PT Neuro     Row Name 19 0900             Balance Skills Training    55554 -  PT Neuromuscular Reeducation Minutes  30  -MW      Training Strategies (Balance)  RE-assessment completed, see for details.  ST. balance with SLS with tapping 6 cones with min A x 5 sets.  SLS with one foot on rolling stool with knee flex/ext x 10 with min A and LOB 30% of the time and then SLS with foot on rolling stool and performing hip int/ext rotation x 10 with min A.  Tall kneeling with fwd/bwd walking.  Prone press up into alternating half kneeling B x 5 with CGA.  Single limb on 10\" step and other stepping to third step without UE A x 10 with min/mod A, one foot on 10\" step with other LE performing hip abduction x 10 with min/mod A for balance.  -        User Key  (r) = Recorded By, (t) = Taken By, (c) = Cosigned By    Initials Name Provider Type     Mercedes Anderson, PT Physical Therapist                  PT Assessment/Plan     Row Name 19 0830          PT Assessment    Functional Limitations  Decreased safety during functional activities;Impaired gait;Impaired locomotion;Limitations in community activities;Performance in work activities  -     Impairments  Balance;Endurance;Gait;Locomotion;Muscle strength;Pain;Range of motion;Sensation;Posture  -     Assessment Comments  Pt. did not meet any goals today but did improve his TUG, VALLEJO and FGA scores today.  Pt. continues to require min/mod A with standing dynamic balance activities.  Pt. demonstrates LOB up to 40% of the time.  Pt. to benefit from skilled PT services to improve " "functional mobility.  Requesting 8 additional visits to meet goals.  -MW     Please refer to paper survey for additional self-reported information  Yes  -MW     Rehab Potential  Good  -MW     Patient/caregiver participated in establishment of treatment plan and goals  Yes  -MW     Patient would benefit from skilled therapy intervention  Yes  -MW        PT Plan    PT Frequency  1x/week  -MW     Predicted Duration of Therapy Intervention (Therapy Eval)  8 visits  -MW     Planned CPT's?  PT THER PROC EA 15 MIN: 18953;PT NEUROMUSC RE-EDUCATION EA 15 MIN: 23434;PT GAIT TRAINING EA 15 MIN: 94898  -MW     PT Plan Comments  Continue with PT services to improve gait, balance, strength and overall functional mobility.  -MW       User Key  (r) = Recorded By, (t) = Taken By, (c) = Cosigned By    Initials Name Provider Type    Mercedes Arana, PT Physical Therapist             Exercises     Row Name 04/01/19 0900             Precautions    Existing Precautions/Restrictions  fall  -MW         Subjective Comments    Subjective Comments  \"I had a stumble last week.  I was at home and I went down onto one knee.  I didn't get hurt though.\"  -MW         Subjective Pain    Able to rate subjective pain?  yes  -MW      Pre-Treatment Pain Level  0  -MW      Post-Treatment Pain Level  0  -MW         Total Minutes    72666 - PT Therapeutic Exercise Minutes  30  -MW      22196 -  PT Neuromuscular Reeducation Minutes  30  -MW         Exercise 1    Exercise Name 1  Elliptical L6  -MW      Time 1  4 min  -MW      Additional Comments  B UE/LEs  -MW         Exercise 2    Exercise Name 2  Quad knee to chest and press into hip ext  -MW      Sets 2  1  -MW      Reps 2  10  -MW         Exercise 3    Exercise Name 3  Quad B fire hydrants  -MW      Sets 3  1  -MW      Reps 3  10  -MW         Exercise 4    Exercise Name 4  Supine DKC hold with post pelvic tilt  -MW      Sets 4  1  -MW      Reps 4  10  -MW         Exercise 5    Exercise Name 5  " Supine DKC with alternating flutter kicks  -MW      Sets 5  1  -MW      Reps 5  10  -MW         Exercise 6    Exercise Name 6  Long sitting bottom walking fwd/bwd  -MW      Time 6  1 min  -MW        User Key  (r) = Recorded By, (t) = Taken By, (c) = Cosigned By    Initials Name Provider Type    Mercedes Arana, PT Physical Therapist                      PT OP Goals     Row Name 04/01/19 0830          PT Short Term Goals    STG Date to Achieve  03/19/19  -MW     STG 1  Patient to improve VALLEJO balance score to >/= 43/56 to decrease client's risk of falls.  -MW     STG 1 Progress  Met  -MW     STG 2  Patient to perform TUG within 13 sec without LOB for improved functional mobility.  -MW     STG 2 Progress  Met  -MW     STG 3  Patient to improve FGA score to >/= 15/30 to decrease client's risk of falls.  -MW     STG 3 Progress  Ongoing  -MW     STG 4  Pt. to be able to rosangela/doff L AFO/toe-off brace for improved gait mechanics.  -MW     STG 4 Progress  Met  -MW        Long Term Goals    LTG Date to Achieve  04/09/19  -MW     LTG 1  Patient to improve VALLEJO balance score to >/= 50/56 to decrease client's risk of falls.  -MW     LTG 1 Progress  Ongoing  -MW     LTG 2  Patient to perform TUG within 11 sec without LOB for improved functional mobility.  -MW     LTG 2 Progress  Ongoing  -MW     LTG 3  Patient to improve FGA score to >/= 24/30 to decrease client's risk of falls.  -MW     LTG 3 Progress  Ongoing  -MW     LTG 4  Patient to ambulate 10 meters without AD within 8 sec without LOB for improved gait ruby and functional mobility.  -MW     LTG 4 Progress  Ongoing  -MW     LTG 5  Pt. to be I with HEP.  -MW     LTG 5 Progress  Ongoing  -MW       User Key  (r) = Recorded By, (t) = Taken By, (c) = Cosigned By    Initials Name Provider Type    Mercedes Arana PT Physical Therapist          Therapy Education  Given: Symptoms/condition management, Mobility training  Program: Reinforced  How Provided:  Verbal  Provided to: Patient  Level of Understanding: Verbalized, Demonstrated    Outcome Measure Options: 10 Meter Walk, James Balance, FGA (Functional Gait Assessment), Timed Up and Go (TUG)  10 Meter Walk Test Self-Selected Velocity  Self-Selected Velocity: Trial 1: 10.23 sec.  10 Meter Walk Test Fast Velocity  10 Meter Walk Fast Velocity: Trial 1: 9.95 sec.  James Balance Scale  Sitting to Standing: able to stand without using hands and stabilize independently  Standing Unsupported: able to stand safely for 2 minutes  Sitting with Back Unsupported but Feet Supported on Floor or on Stool: able to sit safely and securely for 2 minutes  Standing to Sitting: controls descent by using hands  Transfers: able to transfer safely definite need of hands  Standing Unsupported with Eyes Closed: able to stand 10 seconds safely  Standing Unsupported with Feet Together: able to place feet together independently and stand 1 minute safely  Reaching Forward with Outstretched Arm While Standing: can reach forward confidently 25 cm (10 inches)   Object From the Floor From a Standing Position: able to  object safely and easily  Turning to Look Behind Over Left and Right Shoulders While Standing: looks behind from both sides and weight shifts well  Turn 360 Degrees: able to turn 360 degrees safely but slowly  Place Alternate Foot on Step or Stool While Standing Unsupported: able to complete 4 steps without aid with supervision  Standing Unsupported with One Foot in Front: able to place foot ahead independently and hold 30 seconds  Standing on One Leg: able to lift leg independently and hold >/equal to 3 seconds  James Total Score: 47  Functional Gait Assessment (FGA)  Gait Level Surface: Mild Impairment  Change in Gait Speed: Mild Impairment  Gait with Horizontal Head Turns: Moderate Impairment  Gait with Vertical Head Turns: Moderate Impairment  Gait and Pivot Turn: Mild Impairment  Step Over Obstacle: Mild  Impairment  Gait with Narrow Base of Support: Severe Impairment  Gait with Eyes Closed: Moderate Impairment  Ambulating Backwards: Moderate Impairment  Steps: Mild Impairment  FGA Total Score: 14  Timed Up and Go (TUG)  TUG Test 1: 12.21 seconds      Time Calculation:   Start Time: 0830   Therapy Charges for Today     Code Description Service Date Service Provider Modifiers Qty    36077724919 HC PT NEUROMUSC RE EDUCATION EA 15 MIN 4/1/2019 Mercedes Anderson, PT GP 2    [id number] HC PT THER PROC EA 15 MIN 4/1/2019 Mercedes Anderson, PT GP 2          PT G-Codes  Outcome Measure Options: 10 Meter Walk, James Balance, FGA (Functional Gait Assessment), Timed Up and Go (TUG)  James Total Score: 47  FGA Total Score: 14  TUG Test 1: 12.21 seconds         Mercedes Anderson, PT  4/1/2019

## 2019-04-08 ENCOUNTER — HOSPITAL ENCOUNTER (OUTPATIENT)
Dept: PHYSICAL THERAPY | Facility: HOSPITAL | Age: 64
Setting detail: THERAPIES SERIES
Discharge: HOME | End: 2019-04-08

## 2019-04-08 DIAGNOSIS — R26.89 BALANCE PROBLEM: Primary | ICD-10-CM

## 2019-04-08 DIAGNOSIS — M21.372 LEFT FOOT DROP: ICD-10-CM

## 2019-04-08 DIAGNOSIS — R26.9 GAIT ABNORMALITY: ICD-10-CM

## 2019-04-08 PROCEDURE — 97110 THERAPEUTIC EXERCISES: CPT | Performed by: PHYSICAL THERAPIST

## 2019-04-08 PROCEDURE — 97112 NEUROMUSCULAR REEDUCATION: CPT | Performed by: PHYSICAL THERAPIST

## 2019-04-08 NOTE — THERAPY TREATMENT NOTE
"    Outpatient Physical Therapy Neuro Treatment Note  [facility]     Patient Name: Tom Blair  : 1955  MRN: [id number]  Today's Date: 2019      Visit Date: 2019    Visit Dx:    ICD-10-CM ICD-9-CM   1. Balance problem R26.89 781.99   2. Gait abnormality R26.9 781.2   3. Left foot drop M21.372 736.79       There is no problem list on file for this patient.          PT Neuro     Row Name 19 0888             Subjective Comments    Subjective Comments  \"I haven't had any stumbles this week.\"  -MW         Subjective Pain    Able to rate subjective pain?  yes  -MW      Pre-Treatment Pain Level  0  -MW      Post-Treatment Pain Level  0  -MW         Balance Skills Training    Training Strategies (Balance)  Standing dynamic balance with fwd and B sidestepping along 30 ft agility ladder x 4 with one foot on every square and then one foot in every other square with min A and then hold staggered standing along agility ladder with toss/catch ball 30 ft x 4 with mod A and LOB up to 60% of the time.  -        User Key  (r) = Recorded By, (t) = Taken By, (c) = Cosigned By    Initials Name Provider Type    Mercedes Arana, PT Physical Therapist                  PT Assessment/Plan     Row Name 19 9245          PT Assessment    Assessment Comments  Pt. demonstrates increased LOB with standing balance activities when B LE's are fatigued.  Pt. demonstrates LOB up to 60% of the time requiring mod A.  Pt. to benefit from skilled PT services to improve gait, balance, strength and overall functional mobility.  -        PT Plan    PT Plan Comments  Continue with PT services to improve gait, balance, strength, transfers and overall functional mobility.  -       User Key  (r) = Recorded By, (t) = Taken By, (c) = Cosigned By    Initials Name Provider Type    Mercedes Arana, PT Physical Therapist             Exercises     Row Name 19 5345             Subjective Comments    Subjective " "Comments  \"I haven't had any stumbles this week.\"  -MW         Subjective Pain    Able to rate subjective pain?  yes  -MW      Pre-Treatment Pain Level  0  -MW      Post-Treatment Pain Level  0  -MW         Total Minutes    01587 - PT Therapeutic Exercise Minutes  15  -MW      05523 -  PT Neuromuscular Reeducation Minutes  30  -MW         Exercise 1    Exercise Name 1  NuStep L6  -MW      Time 1  8 min  -MW      Additional Comments  B UE/LEs  -MW         Exercise 2    Exercise Name 2  Total gym DLP  -MW      Sets 2  2  -MW      Time 2  2 min  -MW         Exercise 3    Exercise Name 3  Total gym SLP  -MW      Sets 3  2  -MW      Time 3  1 min  -MW        User Key  (r) = Recorded By, (t) = Taken By, (c) = Cosigned By    Initials Name Provider Type    Mercedes Arana, PT Physical Therapist                          Therapy Education  Given: Symptoms/condition management, Posture/body mechanics, Mobility training  Program: Reinforced  How Provided: Verbal  Provided to: Patient  Level of Understanding: Verbalized, Demonstrated              Time Calculation:   Start Time: 0845   Therapy Charges for Today     Code Description Service Date Service Provider Modifiers Qty    56648095445  PT NEUROMUSC RE EDUCATION EA 15 MIN 4/8/2019 Mercedes Anderson, PT GP 2    [id number]  PT THER PROC EA 15 MIN 4/8/2019 Mercedes Anderson, PT GP 1                    Mercedes Anderson PT  4/8/2019     "

## 2019-04-15 ENCOUNTER — HOSPITAL ENCOUNTER (OUTPATIENT)
Dept: PHYSICAL THERAPY | Facility: HOSPITAL | Age: 64
Setting detail: THERAPIES SERIES
Discharge: HOME | End: 2019-04-15

## 2019-04-15 DIAGNOSIS — M21.372 LEFT FOOT DROP: ICD-10-CM

## 2019-04-15 DIAGNOSIS — R26.89 BALANCE PROBLEM: Primary | ICD-10-CM

## 2019-04-15 DIAGNOSIS — R26.9 GAIT ABNORMALITY: ICD-10-CM

## 2019-04-15 PROCEDURE — 97112 NEUROMUSCULAR REEDUCATION: CPT | Performed by: PHYSICAL THERAPIST

## 2019-04-15 PROCEDURE — 97110 THERAPEUTIC EXERCISES: CPT | Performed by: PHYSICAL THERAPIST

## 2019-04-15 NOTE — THERAPY TREATMENT NOTE
Outpatient Physical Therapy Neuro Treatment Note  [facility]     Patient Name: Tom Blair  : 1955  MRN: [id number]  Today's Date: 4/15/2019      Visit Date: 04/15/2019    Visit Dx:    ICD-10-CM ICD-9-CM   1. Balance problem R26.89 781.99   2. Gait abnormality R26.9 781.2   3. Left foot drop M21.372 736.79       There is no problem list on file for this patient.          PT Neuro     Row Name 04/15/19 0845             Subjective Pain    Able to rate subjective pain?  yes  -MW      Pre-Treatment Pain Level  0  -MW      Post-Treatment Pain Level  0  -MW         Balance Skills Training    Training Strategies (Balance)  Prone press up into alternating half kneeling x 5, 2 sets with CGA.  SLS with one foot attempting to tap 4 cones keepin foot up with min/mod A with pt unable to tap all four.  Pt. has LOB up to 60% of the time.  -MW        User Key  (r) = Recorded By, (t) = Taken By, (c) = Cosigned By    Initials Name Provider Type    Mercedes Arana, PT Physical Therapist                  PT Assessment/Plan     Row Name 04/15/19 0845          PT Assessment    Assessment Comments  Pt. requires min/mod A with standing dynamic balance activities.  Pt. continues to demonstrate core and B LE weakness with all activities.  Pt has LOB up to 60% of the time and requires VCing to contract hip muscles in stance to increase stability.  Pt. to benefit from skilled PT services to meet goals.    -MW        PT Plan    PT Plan Comments  Continue with PT services to improve gait, balance, strength, transfers, and overall functional mobility.  -MW       User Key  (r) = Recorded By, (t) = Taken By, (c) = Cosigned By    Initials Name Provider Type    Mercedes Arana, PT Physical Therapist             Exercises     Row Name 04/15/19 0845             Subjective Pain    Able to rate subjective pain?  yes  -MW      Pre-Treatment Pain Level  0  -MW      Post-Treatment Pain Level  0  -MW         Total Minutes    68943 -  PT Therapeutic Exercise Minutes  30  -MW      23375 -  PT Neuromuscular Reeducation Minutes  15  -MW         Exercise 1    Exercise Name 1  NuStep L6  -MW      Time 1  8 min  -MW      Additional Comments  B UE/LEs  -MW         Exercise 2    Exercise Name 2  Quad knee to chest and press into ext  -MW      Sets 2  2  -MW      Reps 2  10  -MW         Exercise 3    Exercise Name 3  Supine knee to chest with bicycling  -MW      Sets 3  2  -MW      Reps 3  10  -MW         Exercise 4    Exercise Name 4  Supine knees to chest with alternating knee ext  -MW      Sets 4  1  -MW      Reps 4  10  -MW         Exercise 5    Exercise Name 5  Supine knees to chest with B hip ext/int rot  -MW      Sets 5  2  -MW      Reps 5  5  -MW         Exercise 6    Exercise Name 6  Standing B hip ext and abduction with and without 3 sec hold  -MW      Sets 6  2  -MW      Reps 6  10  -MW      Additional Comments  B UE A with VCing to keep B knees flexed with exercise  -MW        User Key  (r) = Recorded By, (t) = Taken By, (c) = Cosigned By    Initials Name Provider Type    Mercedes Arana, PT Physical Therapist                          Therapy Education  Given: HEP  Program: New, Reinforced  How Provided: Verbal, Written, Demonstration  Provided to: Patient  Level of Understanding: Demonstrated, Verbalized              Time Calculation:   Start Time: 0845   Therapy Charges for Today     Code Description Service Date Service Provider Modifiers Qty    [id number]  PT NEUROMUSC RE EDUCATION EA 15 MIN 4/15/2019 Mercedes Anderson, PT GP 1    [id number]  PT THER PROC EA 15 MIN 4/15/2019 Mercedes Anderson, PT GP 2                    Mercedes Anderson PT  4/15/2019

## 2019-04-22 ENCOUNTER — HOSPITAL ENCOUNTER (OUTPATIENT)
Dept: PHYSICAL THERAPY | Facility: HOSPITAL | Age: 64
Setting detail: THERAPIES SERIES
Discharge: HOME | End: 2019-04-22

## 2019-04-22 DIAGNOSIS — R26.89 BALANCE PROBLEM: Primary | ICD-10-CM

## 2019-04-22 DIAGNOSIS — M21.372 LEFT FOOT DROP: ICD-10-CM

## 2019-04-22 DIAGNOSIS — R26.9 GAIT ABNORMALITY: ICD-10-CM

## 2019-04-22 PROCEDURE — 97112 NEUROMUSCULAR REEDUCATION: CPT | Performed by: PHYSICAL THERAPIST

## 2019-04-22 PROCEDURE — 97110 THERAPEUTIC EXERCISES: CPT | Performed by: PHYSICAL THERAPIST

## 2019-04-22 NOTE — THERAPY TREATMENT NOTE
"    Outpatient Physical Therapy Neuro Treatment Note  [facility]     Patient Name: Tom Blair  : 1955  MRN: [id number]  Today's Date: 2019      Visit Date: 2019    Visit Dx:    ICD-10-CM ICD-9-CM   1. Balance problem R26.89 781.99   2. Gait abnormality R26.9 781.2   3. Left foot drop M21.372 736.79       There is no problem list on file for this patient.          PT Neuro     Row Name 19 0845             Subjective Comments    Subjective Comments  \"I've had two falls.  One was when I caught my left foot on the steps and I hit my L knee.  Then I was doing my exercises and I think I was too tired and caught my left foot and I fell into the wall onto my left shoulder.  I'm sore.\"  -MW         Subjective Pain    Able to rate subjective pain?  yes  -MW      Pre-Treatment Pain Level  0  -MW      Post-Treatment Pain Level  0  -MW      Subjective Pain Comment  pt reports soreness in L knee and L shoulder from falls  -         Balance Skills Training    Training Strategies (Balance)  Raj DELANEY LE's fwd, 45 degrees, 90 degrees, 135 degrees and bwds x 5, 2 sets with min/mod A with just stepping to line and then performing rotation with stepping.  B fwd stepping up onto/off blue foam without UE A x 10 with min/mod A with pt demonstrating increased difficulty with fatigue.    -        User Key  (r) = Recorded By, (t) = Taken By, (c) = Cosigned By    Initials Name Provider Type    Mercedes Arana, PT Physical Therapist                  PT Assessment/Plan     Row Name 19 0845          PT Assessment    Assessment Comments  Pt. requires min/mod A with standing dynamic balance activities.  Pt. demonstrates LOB with increased fatigue.  Pt. demonstrates decreased stability today and requires min A with gait in between rest breaks.  Pt. to benefit from skilled PT services to improve gait, balance, strength, transfers and overall functional mobility.  -        PT Plan    PT Plan " "Comments  Continue with PT services to improve gait, balance, strength, transfers and overall functional mobility.  -MW       User Key  (r) = Recorded By, (t) = Taken By, (c) = Cosigned By    Initials Name Provider Type    Mercedes Arana PT Physical Therapist             Exercises     Row Name 04/22/19 0845             Subjective Comments    Subjective Comments  \"I've had two falls.  One was when I caught my left foot on the steps and I hit my L knee.  Then I was doing my exercises and I think I was too tired and caught my left foot and I fell into the wall onto my left shoulder.  I'm sore.\"  -MW         Subjective Pain    Able to rate subjective pain?  yes  -MW      Pre-Treatment Pain Level  0  -MW      Post-Treatment Pain Level  0  -MW      Subjective Pain Comment  pt reports soreness in L knee and L shoulder from falls  -MW         Total Minutes    20246 - PT Therapeutic Exercise Minutes  25  -MW      56699 -  PT Neuromuscular Reeducation Minutes  20  -MW         Exercise 1    Exercise Name 1  NuStep L7  -MW      Time 1  8 min  -MW      Additional Comments  B UE/LEs  -MW         Exercise 2    Exercise Name 2  Total gym DLP  -MW      Sets 2  2  -MW      Time 2  2 min  -MW         Exercise 3    Exercise Name 3  Total gym SLP L LE  -MW      Sets 3  2  -MW      Time 3  1 min  -MW        User Key  (r) = Recorded By, (t) = Taken By, (c) = Cosigned By    Initials Name Provider Type    Mercedes Arana PT Physical Therapist                          Therapy Education  Given: Symptoms/condition management, Mobility training, Posture/body mechanics  Program: Reinforced  How Provided: Verbal  Provided to: Patient  Level of Understanding: Verbalized              Time Calculation:   Start Time: 0845   Therapy Charges for Today     Code Description Service Date Service Provider Modifiers Qty    [id number]  PT NEUROMUSC RE EDUCATION EA 15 MIN 4/22/2019 Mercedes Anderson, PT GP 1    [id number]  PT THER PROC EA " 15 MIN 4/22/2019 Mercedes Anderson, PT GP 2                    Mercedes Anderson, PT  4/22/2019

## 2019-04-23 ENCOUNTER — OFFICE VISIT (OUTPATIENT)
Dept: NEUROLOGY | Facility: CLINIC | Age: 64
End: 2019-04-23

## 2019-04-23 VITALS
SYSTOLIC BLOOD PRESSURE: 118 MMHG | BODY MASS INDEX: 29.73 KG/M2 | WEIGHT: 257 LBS | HEIGHT: 78 IN | DIASTOLIC BLOOD PRESSURE: 62 MMHG

## 2019-04-23 DIAGNOSIS — G62.9 NEUROPATHY: Primary | ICD-10-CM

## 2019-04-23 PROCEDURE — 99213 OFFICE O/P EST LOW 20 MIN: CPT | Performed by: PSYCHIATRY & NEUROLOGY

## 2019-04-23 RX ORDER — GABAPENTIN 300 MG/1
CAPSULE ORAL
Qty: 30 CAPSULE | Refills: 3 | Status: SHIPPED | OUTPATIENT
Start: 2019-04-23 | End: 2021-01-26 | Stop reason: SDUPTHER

## 2019-04-23 NOTE — PROGRESS NOTES
Subjective:    CC: Tom Blair is in clinic today for follow up for diabetic neuropathy.    HPI:  He is in clinic for regular follow-up.  Since her last visit, he reports that some days in the afternoon, pain, tingling and numbness is worse and is requesting to increase gabapentin afternoon dose to 300 mg.  He continues to get physical therapy and that seems to be keeping things under control.  No falls since the last visit.  He continues to use AFO on the left which helps with balance.  He finally underwent EMG/nerve conduction study at  which I reviewed personally it showed mixed motor and sensory,  axonal and demyelinating severe generalized neuropathy.  MRI brain was done as well and it did not reveal any acute intracranial abnormalities.  He continues to have intermittent muscle twitching in both the calf muscles.    The following portions of the patient's history were reviewed and updated as of 04/23/2019: allergies, social history and problem list.       Current Outpatient Medications:   •  aspirin 81 MG tablet, Take  by mouth Daily., Disp: , Rfl:   •  atorvastatin (LIPITOR) 10 MG tablet, , Disp: , Rfl:   •  BYDUREON BCISE 2 MG/0.85ML auto-injector injection, , Disp: , Rfl:   •  Cholecalciferol (VITAMIN D) 1000 units tablet, Take  by mouth., Disp: , Rfl:   •  COMBIGAN 0.2-0.5 % ophthalmic solution, , Disp: , Rfl:   •  enalapril (VASOTEC) 5 MG tablet, , Disp: , Rfl:   •  FORTEO 600 MCG/2.4ML injection, , Disp: , Rfl:   •  gabapentin (NEURONTIN) 100 MG capsule, Take 2 capsules by mouth 3 (Three) Times a Day., Disp: 180 capsule, Rfl: 1  •  INVOKANA 300 MG tablet, , Disp: , Rfl:   •  metFORMIN (GLUMETZA) 500 MG (MOD) 24 hr tablet, Take 500 mg by mouth 2 (Two) Times a Day., Disp: , Rfl:   •  metoprolol tartrate (LOPRESSOR) 25 MG tablet, Take  by mouth Daily (Monday-Friday)., Disp: , Rfl:   •  sertraline (ZOLOFT) 100 MG tablet, Take  by mouth Daily., Disp: , Rfl:   •  ULTICARE MINI PEN NEEDLES 31G X 6 MM misc, ,  "Disp: , Rfl:   •  gabapentin (NEURONTIN) 300 MG capsule, Take 1 capsule in the afternoon., Disp: 30 capsule, Rfl: 3   Past Medical History:   Diagnosis Date   • Diabetes mellitus (CMS/HCC)    • Hyperlipidemia       History reviewed. No pertinent surgical history.   Family History   Problem Relation Age of Onset   • Alzheimer's disease Mother    • Dementia Mother    • Diabetes Mother    • Cancer Father    • Diabetes Father    • Heart disease Father         Review of Systems   Musculoskeletal: Positive for gait problem.   Neurological: Positive for numbness.     Objective:    /62   Ht 200.7 cm (79\")   Wt 117 kg (257 lb)   BMI 28.95 kg/m²     Neurology Exam:  General apperance: NAD.     Mental status: Alert, awake and oriented to time place and person.    Recent and Remote memory: Can recall 3/3 objects at 5 minutes. Can recall historical events.     Attention span and Concentration: Serial 7s: Normal.     Fund of knowledge:  Normal.     Language and Speech: No aphasia or dysarthria.    Naming , Repitition and Comprehension:  Can name objects, repeat a sentence and follow commands. Speech is clear and fluent with good repetition, comprehension, and naming.    CN II to XII: Intact.    Opthalmoscopic Exam: No papilledema.    Motor:  Right UE muscle strength 5/5. Normal tone.     Left UE muscle strength 5/5. Normal tone.      Right LE muscle strength5/5. Normal tone.     Left LE muscle strength 5/5. Normal tone.      Sensory: Normal light touch, vibration and pinprick sensation bilaterally.    DTRs: 2+ bilaterally in upper extremities and 2+ right knee 3+ left knee and 1+ bilateral ankles.     Babinski: Equivocal bilaterally.    Co-ordination: Normal finger-to-nose, heel to shin B/L.    Rhomberg: Negative.    Gait: Normal.    Cardiovascular: Regular rate and rhythm without murmur, gallop or rub.    Assessment and Plan:  1. Neuropathy  EMG/nerve conduction study confirmed severe mixed motor and sensory, axonal and " demyelinating generalized polyneuropathy.  It ruled out possibility of ALS.  He continues to have hyperreflexia involving the left knee and intermittent muscle twitching involving both the calf muscles.  MRI brain was done at  as well and it did not reveal any acute intracranial abnormality.  Since he is reporting worsening and paresthesias in the afternoon, will increase the afternoon dose of gabapentin to 300 mg and will continue with gabapentin 200 mg in the morning and 200 mg at night for symptomatic relief.  Continue with physical therapy.  I will see him back in 3 months for follow-up.    I spent 15 minutes face to face with the patient and spent 10 minutes of this time  in management, instructions and education regarding above mentioned diagnosis and also on counseling and discussing about taking medication regularly, possible side effects with medication use, importance of good sleep hygiene, good hydration and regular exercise.    Return in about 3 months (around 7/23/2019).

## 2019-04-29 ENCOUNTER — HOSPITAL ENCOUNTER (OUTPATIENT)
Dept: PHYSICAL THERAPY | Facility: HOSPITAL | Age: 64
Setting detail: THERAPIES SERIES
Discharge: HOME | End: 2019-04-29

## 2019-04-29 DIAGNOSIS — R26.9 GAIT ABNORMALITY: ICD-10-CM

## 2019-04-29 DIAGNOSIS — M21.372 LEFT FOOT DROP: ICD-10-CM

## 2019-04-29 DIAGNOSIS — R26.89 BALANCE PROBLEM: Primary | ICD-10-CM

## 2019-04-29 PROCEDURE — 97110 THERAPEUTIC EXERCISES: CPT | Performed by: PHYSICAL THERAPIST

## 2019-04-29 PROCEDURE — 97112 NEUROMUSCULAR REEDUCATION: CPT | Performed by: PHYSICAL THERAPIST

## 2019-04-29 PROCEDURE — 97116 GAIT TRAINING THERAPY: CPT | Performed by: PHYSICAL THERAPIST

## 2019-04-29 NOTE — THERAPY PROGRESS REPORT/RE-CERT
"    Outpatient Physical Therapy Neuro Progress Note  [facility]     Patient Name: Tom Blair  : 1955  MRN: [id number]  Today's Date: 2019      Visit Date: 2019    Visit Dx:    ICD-10-CM ICD-9-CM   1. Balance problem R26.89 781.99   2. Gait abnormality R26.9 781.2   3. Left foot drop M21.372 736.79       There is no problem list on file for this patient.          PT Neuro     Row Name 19 0900             Gait/Stairs Assessment/Training    15058 - Gait Training Minutes   15  -MW      Comment (Gait/Stairs)  Ambulation on TM with B UE and single UE A @ 2.6 mph x 1.45 min, and 2 min with min/mod A with emphasis on B LE placement on TM and not L circumduction.  Pt. requries seated rest breaks in between gait sessions.   -MW         Balance Skills Training    Training Strategies (Balance)  Re-assessment completed, see for details.  St. balance with B fwd and rotational lunge to blue foam with toss/catch 2# ball with min/mod A with LOB up to 50% of the time.  One foot on 8\" step and other stepping to third step of staircase without UE A x 10 with mod A and LOB up to 50% of the time.  -MW        User Key  (r) = Recorded By, (t) = Taken By, (c) = Cosigned By    Initials Name Provider Type    Mercedes Arana, PT Physical Therapist                  PT Assessment/Plan     Row Name 19 0845 19 0800       PT Assessment    Functional Limitations  Decreased safety during functional activities;Impaired gait;Impaired locomotion;Limitations in community activities;Performance in work activities  -MW  --  -MW    Impairments  Balance;Endurance;Gait;Locomotion;Muscle strength;Pain;Range of motion;Sensation;Posture  -MW  --  -MW    Assessment Comments  Pt. met STG for FGA today.  Pt. progressing slowly towards remaining goals and will benefit from an additional 8 visits to meet goals.  Pt. requries mostly mod A with standing dynamic balance activities.  Pt. to benefit from skilled PT services to " "improve gait, balance, strength, transfers and overall functional mobility.  -MW  --  -MW    Please refer to paper survey for additional self-reported information  Yes  -MW  --  -MW    Rehab Potential  Good  -MW  --  -MW    Patient/caregiver participated in establishment of treatment plan and goals  Yes  -MW  --  -MW    Patient would benefit from skilled therapy intervention  Yes  -MW  --  -MW       PT Plan    PT Frequency  1x/week  -MW  --  -MW    Predicted Duration of Therapy Intervention (Therapy Eval)  12 visits  -MW  --  -MW    Planned CPT's?  PT THER PROC EA 15 MIN: 84961;PT NEUROMUSC RE-EDUCATION EA 15 MIN: 77156;PT GAIT TRAINING EA 15 MIN: 94767  -MW  --  -MW    PT Plan Comments  Continue with PT services to improve gait, balance, strength and overall functional mobility.  -MW  --  -MW      User Key  (r) = Recorded By, (t) = Taken By, (c) = Cosigned By    Initials Name Provider Type    Mercedes Arana, PT Physical Therapist             Exercises     Row Name 04/29/19 0900             Subjective Comments    Subjective Comments  \"I haven't had any falls this past week.\"  -MW         Total Minutes    97155 - Gait Training Minutes   15  -MW      02477 - PT Therapeutic Exercise Minutes  10  -MW      51671 -  PT Neuromuscular Reeducation Minutes  20  -MW         Exercise 1    Exercise Name 1  Mod plank with hands on mat with B alternating tapping shoulders; alternating fwd stepping  -MW      Sets 1  2  -MW      Reps 1  5  -MW      Additional Comments  min A  -MW        User Key  (r) = Recorded By, (t) = Taken By, (c) = Cosigned By    Initials Name Provider Type    Mercedes Arana, PT Physical Therapist                      PT OP Goals     Row Name 04/29/19 0845          PT Short Term Goals    STG Date to Achieve  03/19/19  -MW     STG 1  Patient to improve VALLEJO balance score to >/= 43/56 to decrease client's risk of falls.  -MW     STG 1 Progress  Met  -MW     STG 2  Patient to perform TUG within 13 sec " without LOB for improved functional mobility.  -     STG 2 Progress  Met  -     STG 3  Patient to improve FGA score to >/= 15/30 to decrease client's risk of falls.  -     STG 3 Progress  Met  -[facility] 4  Pt. to be able to rosangela/doff L AFO/toe-off brace for improved gait mechanics.  -     STG 4 Progress  Met  -        Long Term Goals    LTG Date to Achieve  04/09/19  -MW     LTG 1  Patient to improve VALLEJO balance score to >/= 50/56 to decrease client's risk of falls.  -MW     LTG 1 Progress  Ongoing  -MW     LTG 2  Patient to perform TUG within 11 sec without LOB for improved functional mobility.  -MW     LTG 2 Progress  Ongoing  -MW     LTG 3  Patient to improve FGA score to >/= 24/30 to decrease client's risk of falls.  -MW     LTG 3 Progress  Ongoing  -MW     LTG 4  Patient to ambulate 10 meters without AD within 8 sec without LOB for improved gait ruby and functional mobility.  -     LTG 4 Progress  Ongoing  -     LTG 5  Pt. to be I with HEP.  -     LTG 5 Progress  Ongoing  -        Time Calculation    PT Goal Re-Cert Due Date  05/27/19  -       User Key  (r) = Recorded By, (t) = Taken By, (c) = Cosigned By    Initials Name Provider Type    Mercedes Arana, PT Physical Therapist          Therapy Education  Education Details: pt educated about aquatic therapy and its benefits.  pt. to be given aquatic HEP next session.  Given: Posture/body mechanics, Mobility training  Program: Reinforced  How Provided: Verbal  Provided to: Patient  Level of Understanding: Verbalized, Demonstrated    Outcome Measure Options: 10 Meter Walk, Timed Up and Go (TUG), FGA (Functional Gait Assessment)  10 Meter Walk Test Self-Selected Velocity  Self-Selected Velocity: Trial 1: 10.41 sec.  10 Meter Walk Test Fast Velocity  10 Meter Walk Fast Velocity: Trial 1: 9.37 sec.  Functional Gait Assessment (FGA)  Gait Level Surface: Mild Impairment  Change in Gait Speed: Mild Impairment  Gait with Horizontal Head  Turns: Moderate Impairment  Gait with Vertical Head Turns: Mild Impairment  Gait and Pivot Turn: Mild Impairment  Step Over Obstacle: Mild Impairment  Gait with Narrow Base of Support: Severe Impairment  Gait with Eyes Closed: Mild Impairment  Ambulating Backwards: Severe Impairment  Steps: Mild Impairment  FGA Total Score: 15  Timed Up and Go (TUG)  TUG Test 1: 12 seconds      Time Calculation:   Start Time: 0845   Therapy Charges for Today     Code Description Service Date Service Provider Modifiers Qty    61206861340 HC PT NEUROMUSC RE EDUCATION EA 15 MIN 4/29/2019 Mercedes Anderson, PT GP 1    79503986101 HC PT THER PROC EA 15 MIN 4/29/2019 Mercedes Anderson, PT GP 1    72064635176 HC GAIT TRAINING EA 15 MIN 4/29/2019 Mercedes Anderson, PT GP 1          PT G-Codes  Outcome Measure Options: 10 Meter Walk, Timed Up and Go (TUG), FGA (Functional Gait Assessment)  FGA Total Score: 15  TUG Test 1: 12 seconds         Mercedes Anderson, PT  4/29/2019

## 2019-05-10 ENCOUNTER — HOSPITAL ENCOUNTER (OUTPATIENT)
Dept: PHYSICAL THERAPY | Facility: HOSPITAL | Age: 64
Setting detail: THERAPIES SERIES
Discharge: HOME | End: 2019-05-10

## 2019-05-10 DIAGNOSIS — R26.9 GAIT ABNORMALITY: ICD-10-CM

## 2019-05-10 DIAGNOSIS — R26.89 BALANCE PROBLEM: Primary | ICD-10-CM

## 2019-05-10 PROCEDURE — 97112 NEUROMUSCULAR REEDUCATION: CPT | Performed by: PHYSICAL THERAPIST

## 2019-05-10 PROCEDURE — 97110 THERAPEUTIC EXERCISES: CPT | Performed by: PHYSICAL THERAPIST

## 2019-05-10 NOTE — THERAPY TREATMENT NOTE
"    Outpatient Physical Therapy Neuro Treatment Note  [facility]     Patient Name: Tom Blair  : 1955  MRN: [id number]  Today's Date: 5/10/2019      Visit Date: 05/10/2019    Visit Dx:    ICD-10-CM ICD-9-CM   1. Balance problem R26.89 781.99   2. Gait abnormality R26.9 781.2       There is no problem list on file for this patient.          PT Neuro     Row Name 05/10/19 08             Subjective Comments    Subjective Comments  \"I've been working out in the pool and it's been going great.\"  -MW         Subjective Pain    Able to rate subjective pain?  yes  -MW      Pre-Treatment Pain Level  0  -MW      Post-Treatment Pain Level  0  -MW         Balance Skills Training    90869 -  PT Neuromuscular Reeducation Minutes  45  -MW      Training Strategies (Balance)  Issued HEP for pool therapy, see for details.  Balance with resisted walking with 70# fwd/bwd walking 30ft touching cone x 15 with min/mod A for balance.  B sidestepping with 50# resistance x 5 tapping cone 30ft away with min/mod A.  Agility ladder with one foot in each square walking fwd and B sidestepping 40ft x 4 with min A, one foot in every other square fwd and sidestepping 35 ft x 4 with min/mod A, one foot staggered in front 35 ft x 2 with toss/catch ball with mod A with LOB up to 70% of the time.  Jumping on trampoline without UE A x 10 with mod/max A.  St on trampoline without UE A with EC and added B cerivcal rot/flex/ext x 10 with mod A and LOB 70% of the time.  -MW        User Key  (r) = Recorded By, (t) = Taken By, (c) = Cosigned By    Initials Name Provider Type    Mercedes Arana, PT Physical Therapist                  PT Assessment/Plan     Row Name 05/10/19 08          PT Assessment    Assessment Comments  Pt. requires min to max A with standing dynamic balance and LOB up to 70% of the time.  Pt. to benefit from skilled PT services to improve gait, balance, strength, transfers and overall functional mobility.  Pt. " "demonstrates increased difficulty with LE placement with fatigue.  -MW        PT Plan    PT Plan Comments  Continue with PT services to improve gait, balance, strength, and overall functional mobility.  -MW       User Key  (r) = Recorded By, (t) = Taken By, (c) = Cosigned By    Initials Name Provider Type    Mercedes Arana, LANIE Physical Therapist             Exercises     Row Name 05/10/19 0800             Subjective Comments    Subjective Comments  \"I've been working out in the pool and it's been going great.\"  -MW         Subjective Pain    Able to rate subjective pain?  yes  -MW      Pre-Treatment Pain Level  0  -MW      Post-Treatment Pain Level  0  -MW         Total Minutes    68502 - PT Therapeutic Exercise Minutes  10  -MW      20184 -  PT Neuromuscular Reeducation Minutes  45  -MW         Exercise 1    Exercise Name 1  NuStep L7  -MW      Time 1  10 min  -MW      Additional Comments  B UE/LEs  -MW        User Key  (r) = Recorded By, (t) = Taken By, (c) = Cosigned By    Initials Name Provider Type    Mercedes Arana PT Physical Therapist                          Therapy Education  Given: HEP, Posture/body mechanics, Symptoms/condition management  Program: New  How Provided: Demonstration, Verbal, Written  Provided to: Patient  Level of Understanding: Verbalized              Time Calculation:   Start Time: 0815   Therapy Charges for Today     Code Description Service Date Service Provider Modifiers Qty    04930653262  PT NEUROMUSC RE EDUCATION EA 15 MIN 5/10/2019 Mercedes Anderson, PT GP 3    [id number]  PT THER PROC EA 15 MIN 5/10/2019 Mercedes Anderson, PT GP 1                    Mercedes Anderson PT  5/10/2019     "

## 2019-05-13 ENCOUNTER — HOSPITAL ENCOUNTER (OUTPATIENT)
Dept: PHYSICAL THERAPY | Facility: HOSPITAL | Age: 64
Setting detail: THERAPIES SERIES
Discharge: HOME | End: 2019-05-13

## 2019-05-13 DIAGNOSIS — M21.372 LEFT FOOT DROP: ICD-10-CM

## 2019-05-13 DIAGNOSIS — R26.9 GAIT ABNORMALITY: ICD-10-CM

## 2019-05-13 DIAGNOSIS — R26.89 BALANCE PROBLEM: Primary | ICD-10-CM

## 2019-05-13 PROCEDURE — 97110 THERAPEUTIC EXERCISES: CPT | Performed by: PHYSICAL THERAPIST

## 2019-05-13 PROCEDURE — 97116 GAIT TRAINING THERAPY: CPT | Performed by: PHYSICAL THERAPIST

## 2019-05-13 PROCEDURE — 97112 NEUROMUSCULAR REEDUCATION: CPT | Performed by: PHYSICAL THERAPIST

## 2019-05-13 NOTE — THERAPY TREATMENT NOTE
Outpatient Physical Therapy Neuro Treatment Note  [facility]     Patient Name: Tom Blair  : 1955  MRN: [id number]  Today's Date: 2019      Visit Date: 2019    Visit Dx:    ICD-10-CM ICD-9-CM   1. Balance problem R26.89 781.99   2. Gait abnormality R26.9 781.2   3. Left foot drop M21.372 736.79       There is no problem list on file for this patient.          PT Neuro     Row Name 19 0845             Subjective Pain    Able to rate subjective pain?  yes  -MW      Pre-Treatment Pain Level  0  -MW      Post-Treatment Pain Level  0  -MW         Gait/Stairs Assessment/Training    42107 - Gait Training Minutes   15  -MW      Comment (Gait/Stairs)   Ambulation over 500' with bounce/catch, toss catch up and to the sides with pt staggering and grabbing onto the walls with LOB up to 40% of the time.  Retroambulation and gait with EC for 100' with min/mod A with pt veering outside straight line 50% of the time.  Ambulation with full B turns with CGA/min A .  -MW         Balance Skills Training    80215 -  PT Neuromuscular Reeducation Minutes  20  -MW      Training Strategies (Balance)  St. balance with B fwd alternating lunge to BOSU x 10, hold lunge to BOSU with B shoulder flexion overhead x 10, B cervical rot/flex/ext x 10 with min/mod A.  St. balance on rounded side of BOSU with B UE A with B shoulder abduction x 10, alternating punching x 10 with min/mod A.  B UE A on TRX with fwd stepping up onto/off BOSU with min/mod A.  Standing on rounded side of BOSU with B UE A on TRX with mini-squats x 10 with min A.    -MW        User Key  (r) = Recorded By, (t) = Taken By, (c) = Cosigned By    Initials Name Provider Type    Mercedes Arana, PT Physical Therapist                  PT Assessment/Plan     Row Name 19 0845          PT Assessment    Assessment Comments  Pt. requires min/mod A with standing dynamic balance activities and with stepping onto uneven devices such as the BOSU.  Pt.  "has LOB up to 50% of the time with dynamic gait activities and pt having to grab onto the walls.  Pt. to benefit from skilled PT services to meet goals and improve functional mobility.  -MW        PT Plan    PT Plan Comments  Continue with PT services to improve gait, balance, strength, transfers and overall functional mobility.  -MW       User Key  (r) = Recorded By, (t) = Taken By, (c) = Cosigned By    Initials Name Provider Type    Mercedes Arana PT Physical Therapist             Exercises     Row Name 05/13/19 0845             Subjective Comments    Subjective Comments  \"I'm up to an hour workout in the pool every day.\"  -MW         Subjective Pain    Able to rate subjective pain?  yes  -MW      Pre-Treatment Pain Level  0  -MW      Post-Treatment Pain Level  0  -MW         Total Minutes    66225 - Gait Training Minutes   15  -MW      14402 - PT Therapeutic Exercise Minutes  10  -MW      52277 -  PT Neuromuscular Reeducation Minutes  20  -MW         Exercise 1    Exercise Name 1  NuStep L7  -MW      Time 1  10 min  -MW      Additional Comments  B UE/LEs  -MW        User Key  (r) = Recorded By, (t) = Taken By, (c) = Cosigned By    Initials Name Provider Type    Mercedes Arana PT Physical Therapist                          Therapy Education  Given: Mobility training, Symptoms/condition management, Posture/body mechanics  Program: Reinforced  How Provided: Verbal  Provided to: Patient  Level of Understanding: Verbalized              Time Calculation:   Start Time: 0845   Therapy Charges for Today     Code Description Service Date Service Provider Modifiers Qty    99603968744 HC PT NEUROMUSC RE EDUCATION EA 15 MIN 5/13/2019 Mercedes Anderson, PT GP 1    83007988741 HC PT THER PROC EA 15 MIN 5/13/2019 Mercedes Anderson, PT GP 1    [id number] HC GAIT TRAINING EA 15 MIN 5/13/2019 Mercedes Anderson, PT GP 1                    Mercedes Anderson PT  5/13/2019     "

## 2019-05-20 ENCOUNTER — HOSPITAL ENCOUNTER (OUTPATIENT)
Dept: PHYSICAL THERAPY | Facility: HOSPITAL | Age: 64
Setting detail: THERAPIES SERIES
Discharge: HOME | End: 2019-05-20

## 2019-05-20 DIAGNOSIS — R26.89 BALANCE PROBLEM: Primary | ICD-10-CM

## 2019-05-20 DIAGNOSIS — R26.9 GAIT ABNORMALITY: ICD-10-CM

## 2019-05-20 PROCEDURE — 97112 NEUROMUSCULAR REEDUCATION: CPT | Performed by: PHYSICAL THERAPIST

## 2019-05-20 PROCEDURE — 97110 THERAPEUTIC EXERCISES: CPT | Performed by: PHYSICAL THERAPIST

## 2019-05-20 NOTE — THERAPY TREATMENT NOTE
"    Outpatient Physical Therapy Neuro Treatment Note  [facility]     Patient Name: Tom Blair  : 1955  MRN: [id number]  Today's Date: 2019      Visit Date: 2019    Visit Dx:    ICD-10-CM ICD-9-CM   1. Balance problem R26.89 781.99   2. Gait abnormality R26.9 781.2       There is no problem list on file for this patient.          PT Neuro     Row Name 19 0845             Subjective Comments    Subjective Comments  \"I had a near miss going up the stairs by catching my foot but I was able to catch myself.\"  -MW         Subjective Pain    Able to rate subjective pain?  yes  -MW      Pre-Treatment Pain Level  0  -MW      Post-Treatment Pain Level  0  -MW         Balance Skills Training    Training Strategies (Balance)  St. balance with fwd, B sidestepping along balance beam 8 ft x 4 without UE A with LOB 50% of the time, hold tandem with LE behind stepping fwd/bwd x 10 without UE A with min A .  Straddle step up onto/off balance beam without UE A x 5 with emphasis on LE placement with LOB 40% of the time.  Tall kneeling walking fwd/bwd 5 ft x 4 with CGA.  Alteranting B half kneeling x 10 with min A.  Quad B UE/LE's with CGA for balance x 10.    -        User Key  (r) = Recorded By, (t) = Taken By, (c) = Cosigned By    Initials Name Provider Type     Mercedes Anderson, PT Physical Therapist                  PT Assessment/Plan     Row Name 19 0845          PT Assessment    Assessment Comments  Pt. requries min A with LOB up to 50% of the time and emphasis on B LE placement.  Educated pt to work on placement of feet in pool and to see if he can perform step ups in the pool. Pt. to benefit from skilled PT services to improve core and B LE strength with placement.  -        PT Plan    PT Plan Comments  Continue with PT services to improve gait, balance, strength, transfers and overall functional mobility.  -       User Key  (r) = Recorded By, (t) = Taken By, (c) = Cosigned By    " "Initials Name Provider Type    Mercedes Arana, PT Physical Therapist             Exercises     Row Name 05/20/19 0845             Subjective Comments    Subjective Comments  \"I had a near miss going up the stairs by catching my foot but I was able to catch myself.\"  -MW         Subjective Pain    Able to rate subjective pain?  yes  -MW      Pre-Treatment Pain Level  0  -MW      Post-Treatment Pain Level  0  -MW         Total Minutes    15699 - PT Therapeutic Exercise Minutes  15  -MW      74792 -  PT Neuromuscular Reeducation Minutes  30  -MW         Exercise 1    Exercise Name 1  NuStep L7  -MW      Time 1  8 min  -MW      Additional Comments  B UE/LEs  -MW         Exercise 2    Exercise Name 2  Quad B hamstring curl; prone B hamstring curl with yellow tband resistance  -MW      Sets 2  2  -MW      Reps 2  10  -MW         Exercise 3    Exercise Name 3  Quad B fire hydrants, B hip circles  -MW      Sets 3  1  -MW      Reps 3  10  -MW        User Key  (r) = Recorded By, (t) = Taken By, (c) = Cosigned By    Initials Name Provider Type    Mercedes Arana, PT Physical Therapist                          Therapy Education  Given: HEP  Program: Reinforced  How Provided: Verbal  Provided to: Patient  Level of Understanding: Verbalized, Demonstrated              Time Calculation:   Start Time: 0845   Therapy Charges for Today     Code Description Service Date Service Provider Modifiers Qty    80530326410  PT NEUROMUSC RE EDUCATION EA 15 MIN 5/20/2019 Mercedes Anderson, PT GP 2    [id number]  PT THER PROC EA 15 MIN 5/20/2019 Mercedes Anderson, PT GP 1                    Mercedes Anderson PT  5/20/2019     "

## 2019-05-30 ENCOUNTER — HOSPITAL ENCOUNTER (OUTPATIENT)
Dept: PHYSICAL THERAPY | Facility: HOSPITAL | Age: 64
Setting detail: THERAPIES SERIES
Discharge: HOME | End: 2019-05-30

## 2019-05-30 DIAGNOSIS — R26.89 BALANCE PROBLEM: Primary | ICD-10-CM

## 2019-05-30 PROCEDURE — 97110 THERAPEUTIC EXERCISES: CPT | Performed by: PHYSICAL THERAPIST

## 2019-05-30 PROCEDURE — 97112 NEUROMUSCULAR REEDUCATION: CPT | Performed by: PHYSICAL THERAPIST

## 2019-05-30 NOTE — THERAPY PROGRESS REPORT/RE-CERT
"    .Outpatient Physical Therapy Neuro Progress Note  [facility]     Patient Name: Tom Blair  : 1955  MRN: [id number]  Today's Date: 2019      Visit Date: 2019    There is no problem list on file for this patient.       Past Medical History:   Diagnosis Date   • Diabetes mellitus (CMS/HCC)    • Hyperlipidemia         No past surgical history on file.      Visit Dx:     ICD-10-CM ICD-9-CM   1. Balance problem R26.89 781.99               PT Neuro     Row Name 19 0845             Balance Skills Training    Training Strategies (Balance)  Re-assessment completed, see for details.  St. balance staggered on air discs with LE behind stepping fwd and back tapping cone x 10 with min A and LOB 40% of the time.  One foot on 8\" step and other foot stepping to the third step x 10 with emphasis on placement and maintaining balance x 10 and then alternating x 10 with min A.    -MW        User Key  (r) = Recorded By, (t) = Taken By, (c) = Cosigned By    Initials Name Provider Type    Mercedes Arana, PT Physical Therapist                  Therapy Education  Given: Symptoms/condition management, Posture/body mechanics  Program: Reinforced  How Provided: Verbal  Provided to: Patient  Level of Understanding: Demonstrated, Verbalized    PT OP Goals     Row Name 19 0845          PT Short Term Goals    STG Date to Achieve  19  -MW     STG 1  Patient to improve VALLEJO balance score to >/= 43/56 to decrease client's risk of falls.  -MW     STG 1 Progress  Met  -MW     STG 2  Patient to perform TUG within 13 sec without LOB for improved functional mobility.  -MW     STG 2 Progress  Met  -MW     STG 3  Patient to improve FGA score to >/= 15/30 to decrease client's risk of falls.  -MW     STG 3 Progress  Met  -MW     STG 4  Pt. to be able to rosangela/doff L AFO/toe-off brace for improved gait mechanics.  -MW     STG 4 Progress  Met  -MW        Long Term Goals    LTG Date to Achieve  19  -MW     LTG " 1  Patient to improve VALLEJO balance score to >/= 50/56 to decrease client's risk of falls.  -MW     LTG 1 Progress  Ongoing  -MW     LTG 2  Patient to perform TUG within 11 sec without LOB for improved functional mobility.  -MW     LTG 2 Progress  Ongoing  -MW     LTG 3  Patient to improve FGA score to >/= 24/30 to decrease client's risk of falls.  -MW     LTG 3 Progress  Ongoing  -MW     LTG 4  Patient to ambulate 10 meters without AD within 8 sec without LOB for improved gait ruby and functional mobility.  -MW     LTG 4 Progress  Ongoing  -MW     LTG 5  Pt. to be I with HEP.  -MW     LTG 5 Progress  Ongoing  -MW        Time Calculation    PT Goal Re-Cert Due Date  05/27/19  -MW       User Key  (r) = Recorded By, (t) = Taken By, (c) = Cosigned By    Initials Name Provider Type    Mercedes Arana, PT Physical Therapist          PT Assessment/Plan     Row Name 05/30/19 0845          PT Assessment    Functional Limitations  Decreased safety during functional activities;Impaired gait;Impaired locomotion;Limitations in community activities;Performance in work activities  -     Impairments  Balance;Endurance;Gait;Locomotion;Muscle strength;Pain;Range of motion;Sensation;Posture  -     Assessment Comments  Pt. met LTG for TUG today and continues to improve towards all remaining goals.  Pt. would like to take next week off and see how he does without therapy for a week.  Pt. looking to wean off of attending therapy and is ready to try more on his own.  Pt. to continue with therapy services secondary to continued improvements and reassess when pt will be discharged in two weeks.  -MW     Please refer to paper survey for additional self-reported information  Yes  -MW     Rehab Potential  Good  -MW     Patient/caregiver participated in establishment of treatment plan and goals  Yes  -MW     Patient would benefit from skilled therapy intervention  Yes  -MW        PT Plan    PT Frequency  1x/week  -MW     Predicted  "Duration of Therapy Intervention (Therapy Eval)  8 visits  -MW     Planned CPT's?  PT THER PROC EA 15 MIN: 69084;PT THER ACT EA 15 MIN: 38904;PT NEUROMUSC RE-EDUCATION EA 15 MIN: 48490;PT GAIT TRAINING EA 15 MIN: 42671  -MW     PT Plan Comments  Continue with PT services to improve gait, balance, strength, and overall functional mobility.  -MW       User Key  (r) = Recorded By, (t) = Taken By, (c) = Cosigned By    Initials Name Provider Type    Mercedes Arana, PT Physical Therapist             Exercises     Row Name 05/30/19 0845             Subjective Comments    Subjective Comments  \"I can tell that the pool is really helping.\"  -MW         Subjective Pain    Able to rate subjective pain?  yes  -MW      Pre-Treatment Pain Level  0  -MW      Post-Treatment Pain Level  0  -MW         Total Minutes    26729 - PT Therapeutic Exercise Minutes  15  -MW      16393 -  PT Neuromuscular Reeducation Minutes  30  -MW         Exercise 1    Exercise Name 1  NuStep L7  -MW      Time 1  8 min  -MW      Additional Comments  B UE/LEs  -MW         Exercise 2    Exercise Name 2  Total gym DLP  -MW      Sets 2  2  -MW      Time 2  2 min  -MW         Exercise 3    Exercise Name 3  Total gym L SLP  -MW      Sets 3  2  -MW      Time 3  1 min  -MW        User Key  (r) = Recorded By, (t) = Taken By, (c) = Cosigned By    Initials Name Provider Type    Mercedes Arana, PT Physical Therapist                      Outcome Measure Options: 10 Meter Walk, James Balance, Timed Up and Go (TUG)  10 Meter Walk Test Self-Selected Velocity  Self-Selected Velocity: Trial 1: 9.2 sec.  10 Meter Walk Test Fast Velocity  10 Meter Walk Fast Velocity: Trial 1: 9.06 sec.  James Balance Scale  Sitting to Standing: able to stand without using hands and stabilize independently  Standing Unsupported: able to stand safely for 2 minutes  Sitting with Back Unsupported but Feet Supported on Floor or on Stool: able to sit safely and securely for 2 " minutes  Standing to Sitting: sits safely with minimal use of hands  Transfers: able to transfer safely with minor use of hands  Standing Unsupported with Eyes Closed: able to stand 10 seconds safely  Standing Unsupported with Feet Together: able to place feet together independently and stand 1 minute safely  Reaching Forward with Outstretched Arm While Standing: can reach forward confidently 25 cm (10 inches)   Object From the Floor From a Standing Position: able to  object safely and easily  Turning to Look Behind Over Left and Right Shoulders While Standing: looks behind from both sides and weight shifts well  Turn 360 Degrees: able to turn 360 degrees safely but slowly  Place Alternate Foot on Step or Stool While Standing Unsupported: able to complete 4 steps without aid with supervision  Standing Unsupported with One Foot in Front: able to place foot ahead independently and hold 30 seconds  Standing on One Leg: able to lift leg independently and hold >/equal to 3 seconds  James Total Score: 49  Functional Gait Assessment (FGA)  Gait Level Surface: Mild Impairment  Change in Gait Speed: Mild Impairment  Gait with Horizontal Head Turns: Mild Impairment  Gait with Vertical Head Turns: Mild Impairment  Gait and Pivot Turn: Normal  Step Over Obstacle: Mild Impairment  Gait with Narrow Base of Support: Severe Impairment  Gait with Eyes Closed: Mild Impairment  Ambulating Backwards: Moderate Impairment  Steps: Mild Impairment  FGA Total Score: 18  Timed Up and Go (TUG)  TUG Test 1: 11.47 seconds    Time Calculation:   Start Time: 0845   Therapy Charges for Today     Code Description Service Date Service Provider Modifiers Qty    62835114235 HC PT NEUROMUSC RE EDUCATION EA 15 MIN 5/30/2019 Mercedes Anderson, PT GP 2    71021442759 HC PT THER PROC EA 15 MIN 5/30/2019 Mercedes Anderson, PT GP 1          PT G-Codes  Outcome Measure Options: 10 Meter Walk, James Balance, Timed Up and Go (TUG)  James Total Score:  49  FGA Total Score: 18  TUG Test 1: 11.47 seconds         Mercedes Anderson, PT  5/30/2019

## 2019-06-07 ENCOUNTER — TELEPHONE (OUTPATIENT)
Dept: NEUROLOGY | Facility: CLINIC | Age: 64
End: 2019-06-07

## 2019-06-07 RX ORDER — GABAPENTIN 100 MG/1
200 CAPSULE ORAL 3 TIMES DAILY
Qty: 180 CAPSULE | Refills: 1 | Status: SHIPPED | OUTPATIENT
Start: 2019-06-07 | End: 2019-09-08 | Stop reason: SDUPTHER

## 2019-06-07 NOTE — TELEPHONE ENCOUNTER
----- Message from Kristy Tipton sent at 6/7/2019  9:40 AM EDT -----  Contact: [phone]  Dr. Gunderson,     Pt called requesting a refill on his gabapentin (NEURONTIN) 100 MG capsule. Pt has a 3 day supply left.    [phone]

## 2019-06-10 ENCOUNTER — HOSPITAL ENCOUNTER (OUTPATIENT)
Dept: PHYSICAL THERAPY | Facility: HOSPITAL | Age: 64
Setting detail: THERAPIES SERIES
Discharge: HOME | End: 2019-06-10

## 2019-06-10 DIAGNOSIS — R26.89 BALANCE PROBLEM: Primary | ICD-10-CM

## 2019-06-10 DIAGNOSIS — R26.9 GAIT ABNORMALITY: ICD-10-CM

## 2019-06-10 PROCEDURE — 97112 NEUROMUSCULAR REEDUCATION: CPT | Performed by: PHYSICAL THERAPIST

## 2019-06-10 PROCEDURE — 97110 THERAPEUTIC EXERCISES: CPT | Performed by: PHYSICAL THERAPIST

## 2019-06-10 NOTE — THERAPY DISCHARGE NOTE
"      Outpatient Physical Therapy Neuro Treatment Note/Discharge Summary   [facility]     Patient Name: Tom Blair  : 1955  MRN: [id number]  Today's Date: 6/10/2019      Visit Date: 06/10/2019    Visit Dx:    ICD-10-CM ICD-9-CM   1. Balance problem R26.89 781.99   2. Gait abnormality R26.9 781.2       There is no problem list on file for this patient.           PT Neuro     Row Name 06/10/19 0845             Balance Skills Training    Training Strategies (Balance)  Discharge completed, see for details.  ST. balance on half foam roll with holding tandem and sideways with mini-squats x 10 and holding up to 10 sec with LOB up to 50% of the time.  ONe foot on foam disc and other on slider, sliding bwd x 10 and side lunge slide x 10 with min A.  -MW        User Key  (r) = Recorded By, (t) = Taken By, (c) = Cosigned By    Initials Name Provider Type    Mercedes Arana, PT Physical Therapist                  PT Assessment/Plan     Row Name 06/10/19 0845 06/10/19 0800       PT Assessment    Assessment Comments  Pt. met all STG and LTG with the exception of LTG for FGA and 10 meter.  Pt. did make progress towards the goals he did not meet.  Pt. to continue with HEP on land and in pool upon discharge.  -MW  --       PT Plan    PT Plan Comments  Discharge from PT services and pt to continue with HEP.  -MW  --  -MW      User Key  (r) = Recorded By, (t) = Taken By, (c) = Cosigned By    Initials Name Provider Type    Mercedes Arana, PT Physical Therapist               Exercises     Row Name 06/10/19 0845             Subjective Comments    Subjective Comments  \"I'm doing better.\"  -MW         Subjective Pain    Able to rate subjective pain?  yes  -MW      Pre-Treatment Pain Level  0  -MW      Post-Treatment Pain Level  0  -MW         Total Minutes    46636 - PT Therapeutic Exercise Minutes  8  -MW      40366 -  PT Neuromuscular Reeducation Minutes  35  -MW         Exercise 1    Exercise Name 1  Madai L7  -MW  "     Time 1  8 min  -MW      Additional Comments  B UE/LEs  -        User Key  (r) = Recorded By, (t) = Taken By, (c) = Cosigned By    Initials Name Provider Type    Mercedes Arana, PT Physical Therapist                        PT OP Goals     Row Name 06/10/19 0845          PT Short Term Goals    STG Date to Achieve  03/19/19  -MW     STG 1  Patient to improve VALLEJO balance score to >/= 43/56 to decrease client's risk of falls.  -MW     STG 1 Progress  Met  -MW     STG 2  Patient to perform TUG within 13 sec without LOB for improved functional mobility.  -MW     STG 2 Progress  Met  -MW     STG 3  Patient to improve FGA score to >/= 15/30 to decrease client's risk of falls.  -MW     STG 3 Progress  Met  -MW     STG 4  Pt. to be able to rosangela/doff L AFO/toe-off brace for improved gait mechanics.  -MW     STG 4 Progress  Met  -MW        Long Term Goals    LTG Date to Achieve  04/09/19  -MW     LTG 1  Patient to improve VALLEJO balance score to >/= 50/56 to decrease client's risk of falls.  -MW     LTG 1 Progress  Met  -MW     LTG 2  Patient to perform TUG within 11 sec without LOB for improved functional mobility.  -MW     LTG 2 Progress  Met  -MW     LTG 3  Patient to improve FGA score to >/= 24/30 to decrease client's risk of falls.  -MW     LTG 3 Progress  Partially Met  -MW     LTG 4  Patient to ambulate 10 meters without AD within 8 sec without LOB for improved gait ruby and functional mobility.  -     LTG 4 Progress  Partially Met  -MW     LTG 5  Pt. to be I with HEP.  -     LTG 5 Progress  Met  -MW        Time Calculation    PT Goal Re-Cert Due Date  05/27/19  -       User Key  (r) = Recorded By, (t) = Taken By, (c) = Cosigned By    Initials Name Provider Type    Mercedes Arana, PT Physical Therapist               Outcome Measure Options: 10 Meter Walk, Vallejo Balance, Fugl-[place]on, Timed Up and Go (TUG)  10 Meter Walk Test Self-Selected Velocity  Self-Selected Velocity: Trial 1: 9.8 sec.  10 Meter  Walk Test Fast Velocity  10 Meter Walk Fast Velocity: Trial 1: 9.45 sec.  James Balance Scale  Sitting to Standing: able to stand without using hands and stabilize independently  Standing Unsupported: able to stand safely for 2 minutes  Sitting with Back Unsupported but Feet Supported on Floor or on Stool: able to sit safely and securely for 2 minutes  Standing to Sitting: sits safely with minimal use of hands  Transfers: able to transfer safely with minor use of hands  Standing Unsupported with Eyes Closed: able to stand 10 seconds safely  Standing Unsupported with Feet Together: able to place feet together independently and stand 1 minute safely  Reaching Forward with Outstretched Arm While Standing: can reach forward confidently 25 cm (10 inches)   Object From the Floor From a Standing Position: able to  object safely and easily  Turning to Look Behind Over Left and Right Shoulders While Standing: looks behind from both sides and weight shifts well  Turn 360 Degrees: able to turn 360 degrees safely in 4 seconds or less  Place Alternate Foot on Step or Stool While Standing Unsupported: able to stand independently and safely and complete 8 steps in 20 seconds  Standing Unsupported with One Foot in Front: able to place foot ahead independently and hold 30 seconds  Standing on One Leg: able to lift leg independently and hold >/equal to 3 seconds  James Total Score: 53  Functional Gait Assessment (FGA)  Gait Level Surface: Normal  Change in Gait Speed: Mild Impairment  Gait with Horizontal Head Turns: Normal  Gait with Vertical Head Turns: Normal  Gait and Pivot Turn: Normal  Step Over Obstacle: Normal  Gait with Narrow Base of Support: Severe Impairment  Gait with Eyes Closed: Mild Impairment  Ambulating Backwards: Mild Impairment  Steps: Mild Impairment  FGA Total Score: 23  Timed Up and Go (TUG)  TUG Test 1: 10.99 seconds    Time Calculation:   Start Time: 0845     Therapy Charges for Today     Code  Description Service Date Service Provider Modifiers Qty    62360031192 HC PT NEUROMUSC RE EDUCATION EA 15 MIN 6/10/2019 Mercedes Anderson, PT GP 2    57006133801 HC PT THER PROC EA 15 MIN 6/10/2019 Mercedes Anderson, PT GP 1          PT G-Codes  Outcome Measure Options: 10 Meter Walk, James Balance, Fugl-[place]on, Timed Up and Go (TUG)  James Total Score: 53  FGA Total Score: 23  TUG Test 1: 10.99 seconds     OP PT Discharge Summary  Date of Discharge: 06/10/19  Reason for Discharge: Maximum functional potential achieved  Outcomes Achieved: Patient able to partially acheive established goals  Discharge Destination: Home with home program        Mercedes Anderson, PT  6/10/2019

## 2019-06-12 NOTE — TELEPHONE ENCOUNTER
Pt called in regards to his refill on his gabapentin (NEURONTIN) 100 MG capsule, and it looks like it was printed. Please send refill. Thanks.

## 2019-06-12 NOTE — TELEPHONE ENCOUNTER
Called [facility] pharmacy and left prescription information on voicemail.

## 2019-09-09 RX ORDER — GABAPENTIN 100 MG/1
CAPSULE ORAL
Qty: 180 CAPSULE | Refills: 1 | Status: SHIPPED | OUTPATIENT
Start: 2019-09-09 | End: 2019-11-28 | Stop reason: SDUPTHER

## 2019-12-02 RX ORDER — GABAPENTIN 100 MG/1
CAPSULE ORAL
Qty: 180 CAPSULE | Refills: 1 | Status: SHIPPED | OUTPATIENT
Start: 2019-12-02 | End: 2021-01-26

## 2020-10-09 ENCOUNTER — LAB (OUTPATIENT)
Dept: LAB | Facility: HOSPITAL | Age: 65
End: 2020-10-09

## 2020-10-09 ENCOUNTER — OFFICE VISIT (OUTPATIENT)
Dept: ENDOCRINOLOGY | Facility: CLINIC | Age: 65
End: 2020-10-09

## 2020-10-09 VITALS
OXYGEN SATURATION: 97 % | HEART RATE: 57 BPM | SYSTOLIC BLOOD PRESSURE: 124 MMHG | HEIGHT: 78 IN | WEIGHT: 235.4 LBS | TEMPERATURE: 96.9 F | BODY MASS INDEX: 27.24 KG/M2 | DIASTOLIC BLOOD PRESSURE: 60 MMHG

## 2020-10-09 DIAGNOSIS — E11.8 TYPE 2 DIABETES MELLITUS WITH COMPLICATIONS (HCC): Primary | ICD-10-CM

## 2020-10-09 DIAGNOSIS — E78.2 MIXED HYPERLIPIDEMIA: ICD-10-CM

## 2020-10-09 DIAGNOSIS — E11.49 TYPE II DIABETES MELLITUS WITH NEUROLOGICAL MANIFESTATIONS (HCC): ICD-10-CM

## 2020-10-09 DIAGNOSIS — E11.9 CONTROLLED TYPE 2 DIABETES MELLITUS WITHOUT COMPLICATION, UNSPECIFIED WHETHER LONG TERM INSULIN USE (HCC): ICD-10-CM

## 2020-10-09 DIAGNOSIS — E66.3 OVERWEIGHT WITH BODY MASS INDEX (BMI) OF 26 TO 26.9 IN ADULT: ICD-10-CM

## 2020-10-09 LAB
EXPIRATION DATE: NORMAL
HBA1C MFR BLD: 6.2 %
Lab: NORMAL

## 2020-10-09 PROCEDURE — 82043 UR ALBUMIN QUANTITATIVE: CPT | Performed by: PHYSICIAN ASSISTANT

## 2020-10-09 PROCEDURE — 82570 ASSAY OF URINE CREATININE: CPT | Performed by: PHYSICIAN ASSISTANT

## 2020-10-09 PROCEDURE — 83036 HEMOGLOBIN GLYCOSYLATED A1C: CPT | Performed by: PHYSICIAN ASSISTANT

## 2020-10-09 PROCEDURE — 99213 OFFICE O/P EST LOW 20 MIN: CPT | Performed by: PHYSICIAN ASSISTANT

## 2020-10-09 RX ORDER — APIXABAN 5 MG/1
5 TABLET, FILM COATED ORAL 2 TIMES DAILY
COMMUNITY
Start: 2020-09-08

## 2020-10-09 RX ORDER — SOTALOL HYDROCHLORIDE 120 MG/1
TABLET ORAL
COMMUNITY
Start: 2020-09-08 | End: 2021-07-20 | Stop reason: SDUPTHER

## 2020-10-09 RX ORDER — BLOOD SUGAR DIAGNOSTIC
STRIP MISCELLANEOUS
Qty: 100 EACH | Refills: 3 | Status: SHIPPED | OUTPATIENT
Start: 2020-10-09

## 2020-10-09 NOTE — PROGRESS NOTES
"     Office Note      Date: 10/09/2020  Patient Name: Tom Blair  MRN: [id number]  : 1955    Chief Complaint   Patient presents with   • Diabetes       History of Present Illness:   Tom Blair is a 65 y.o. male who presents today for type 2 diabetes. He remains on metformin and Bydureon. Stopped Invokana last visit when A1c was <6%. Tolerating meds okay. He hasn't been testing FSBS. He wasn't swimming or eating as well for about 8 weeks. 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 Eye exam up to date. He denies any new issues with his feet. Continues to note numbness. He continues to see podiatry regularly. He remains on statin and ACE-I.    Subjective      Review of Systems:   Review of Systems   Constitutional: Negative.    Cardiovascular: Negative.    Gastrointestinal: Negative.    Endocrine: Negative.    Musculoskeletal: Positive for gait problem.   Neurological: Positive for numbness.       The following portions of the patient's history were reviewed and updated as appropriate: allergies, current medications, past family history, past medical history, past social history, past surgical history and problem list.    Objective     Vitals:    10/09/20 0807   BP: 124/60   BP Location: Left arm   Patient Position: Sitting   Cuff Size: Adult   Pulse: 57   Temp: 96.9 °F (36.1 °C)   TempSrc: Infrared   SpO2: 97%   Weight: 107 kg (235 lb 6.4 oz)   Height: 200.7 cm (79\")   PainSc: 0-No pain     Body mass index is 26.52 kg/m².    Physical Exam  Vitals signs reviewed.   Constitutional:       Appearance: Normal appearance.   Neurological:      Mental Status: He is alert.   Psychiatric:         Mood and Affect: Mood and affect normal.         HEMOGLOBIN A1C  Lab Results   Component Value Date    HGBA1C 6.2 10/09/2020         Current Outpatient Medications   Medication Instructions   • atorvastatin (LIPITOR) 10 MG tablet No dose, route, or frequency recorded.   • Bydureon BCise 2 mg, " Subcutaneous, Weekly   • Cholecalciferol (VITAMIN D) 1000 units tablet Oral   • COMBIGAN 0.2-0.5 % ophthalmic solution No dose, route, or frequency recorded.   • Eliquis 5 MG tablet tablet No dose, route, or frequency recorded.   • enalapril (VASOTEC) 5 MG tablet No dose, route, or frequency recorded.   • FORTEO 600 MCG/2.4ML injection No dose, route, or frequency recorded.   • gabapentin (NEURONTIN) 100 MG capsule TAKE 2 CAPSULES BY MOUTH THREE TIMES A DAY   • gabapentin (NEURONTIN) 300 MG capsule Take 1 capsule in the afternoon.   • glucose blood (OneTouch Verio) test strip Testing 1x per day   • metFORMIN ER (GLUCOPHAGE-XR) 1,000 mg, Oral, 2 Times Daily   • sertraline (ZOLOFT) 100 MG tablet Oral, Daily   • sotalol (BETAPACE) 120 MG tablet No dose, route, or frequency recorded.   • ULTICARE MINI PEN NEEDLES 31G X 6 MM misc No dose, route, or frequency recorded.       Assessment / Plan      Assessment & Plan:  1. Type 2 diabetes mellitus with complications (CMS/HCC)  A1c at goal. Continue current meds.  BP okay.  - POC Glycosylated Hemoglobin (Hb A1C)  - Microalbumin / Creatinine Urine Ratio - Urine, Clean Catch; Future    2. 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 Mixed hyperlipidemia  Continue statin.    4. Overweight with body mass index (BMI) of 26 to 26.9 in adult  He will improve diet and start exercising again.      Return in about 6 months (around 4/9/2021) for Next scheduled follow up.     [place] Polanco  10/09/2020

## 2020-10-10 LAB
ALBUMIN UR-MCNC: 3.2 MG/DL
CREAT UR-MCNC: 141.2 MG/DL
MICROALBUMIN/CREAT UR: 22.7 MG/G

## 2020-10-13 PROBLEM — E11.8 TYPE 2 DIABETES MELLITUS WITH COMPLICATIONS (HCC): Status: ACTIVE | Noted: 2020-10-13

## 2020-10-13 RX ORDER — METFORMIN HYDROCHLORIDE 500 MG/1
1000 TABLET, EXTENDED RELEASE ORAL 2 TIMES DAILY
Qty: 360 TABLET | Refills: 3 | Status: SHIPPED | OUTPATIENT
Start: 2020-10-13 | End: 2021-07-20 | Stop reason: SDUPTHER

## 2020-10-13 RX ORDER — EXENATIDE 2 MG/.85ML
2 INJECTION, SUSPENSION, EXTENDED RELEASE SUBCUTANEOUS WEEKLY
Qty: 10.2 ML | Refills: 3 | Status: SHIPPED | OUTPATIENT
Start: 2020-10-13 | End: 2021-07-20 | Stop reason: SDUPTHER

## 2020-11-16 ENCOUNTER — TELEPHONE (OUTPATIENT)
Dept: NEUROLOGY | Facility: CLINIC | Age: 65
End: 2020-11-16

## 2020-11-16 NOTE — TELEPHONE ENCOUNTER
PT CALLED IN TO SCHEDULE F/U APPT WITH DR BAHENA. ALSO STATES HE NEEDS ALL OF HIS MEDICAL RECORDS INCLUDING DR BAHENA SENDING HIM TO PHYSICAL THERAPY. HE NEEDS THESE RECORDS TO BE ABLE TO FILE FOR DISABILITY. HE WAS WANTING TO KNOW IF HE CAN COME PICK THEM UP WHEN HE COMES IN FOR HIS APPT ON 12/3/2020.     PLEASE ADVISE.   [phone]

## 2021-01-26 ENCOUNTER — OFFICE VISIT (OUTPATIENT)
Dept: NEUROLOGY | Facility: CLINIC | Age: 66
End: 2021-01-26

## 2021-01-26 VITALS
OXYGEN SATURATION: 97 % | HEART RATE: 58 BPM | HEIGHT: 78 IN | BODY MASS INDEX: 26.52 KG/M2 | SYSTOLIC BLOOD PRESSURE: 136 MMHG | TEMPERATURE: 97.7 F | DIASTOLIC BLOOD PRESSURE: 70 MMHG

## 2021-01-26 DIAGNOSIS — G62.9 NEUROPATHY: Primary | ICD-10-CM

## 2021-01-26 PROCEDURE — 99214 OFFICE O/P EST MOD 30 MIN: CPT | Performed by: PSYCHIATRY & NEUROLOGY

## 2021-01-26 RX ORDER — GABAPENTIN 300 MG/1
CAPSULE ORAL
Qty: 270 CAPSULE | Refills: 1 | Status: SHIPPED | OUTPATIENT
Start: 2021-01-26 | End: 2021-05-25 | Stop reason: SDUPTHER

## 2021-01-26 RX ORDER — MULTIPLE VITAMINS W/ MINERALS TAB 9MG-400MCG
1 TAB ORAL DAILY
COMMUNITY

## 2021-01-26 NOTE — PROGRESS NOTES
Subjective:    CC: Tom Blair is in clinic today for follow up for      HPI:  Follow-up: 4/13/2019: He is in clinic for regular follow-up.  Since her last visit, he reports that some days in the afternoon, pain, tingling and numbness is worse and is requesting to increase gabapentin afternoon dose to 300 mg.  He continues to get physical therapy and that seems to be keeping things under control.  No falls since the last visit.  He continues to use AFO on the left which helps with balance.  He finally underwent EMG/nerve conduction study at  which I reviewed personally it showed mixed motor and sensory,  axonal and demyelinating severe generalized neuropathy.  MRI brain was done as well and it did not reveal any acute intracranial abnormalities.  He continues to have intermittent muscle twitching in both the calf muscles.    Follow-up: 1/26/2021: He is in clinic for regular follow-up.  He is in follow-up after nearly 1 and half years.  In last 1 and half years, he reports that he has had significant deterioration as far as his balance, walking is concerned because of worsening in neuropathy symptoms.  He reports that he has to now use cane to walk.  He is unable to walk for prolonged.  Of time and has to take frequent breaks while walking.  He continues to take gabapentin 2 to 300 mg in the morning, 300 mg in the afternoon and 2 to 300 mg at night.    The following portions of the patient's history were reviewed and updated as of 01/26/2021: allergies, social history and problem list.       Current Outpatient Medications:   •  Bydureon BCise 2 MG/0.85ML auto-injector injection, Inject 0.85 mL under the skin into the appropriate area as directed 1 (One) Time Per Week., Disp: 10.2 mL, Rfl: 3  •  Eliquis 5 MG tablet tablet, , Disp: , Rfl:   •  FORTEO 600 MCG/2.4ML injection, , Disp: , Rfl:   •  gabapentin (NEURONTIN) 300 MG capsule, Take 1 capsule in the afternoon., Disp: 30 capsule, Rfl: 3  •  glucose blood  (OneTouch Verio) test strip, Testing 1x per day, Disp: 100 each, Rfl: 3  •  metFORMIN (GLUCOPHAGE-XR) 500 MG 24 hr tablet, Take 2 tablets by mouth 2 (Two) Times a Day., Disp: 360 tablet, Rfl: 3  •  multivitamin with minerals tablet tablet, Take 1 tablet by mouth Daily., Disp: , Rfl:   •  sertraline (ZOLOFT) 100 MG tablet, Take  by mouth Daily., Disp: , Rfl:   •  sotalol (BETAPACE) 120 MG tablet, , Disp: , Rfl:   •  ULTICARE MINI PEN NEEDLES 31G X 6 MM misc, , Disp: , Rfl:    Past Medical History:   Diagnosis Date   • Body mass index (BMI) of 25.0-25.9 in adult    • Diabetes mellitus (CMS/HCC)    • Diabetic foot ulcer (CMS/HCC)    • Disorder associated with type 2 diabetes mellitus (CMS/HCC)    • Disorder of nervous system     Disorder of nervous system due to type 2 diabetes mellitus-Abstracted from [facility]   • Dyslipidemia    • Foot drop, left    • Glaucoma    • History of kidney stones    • History of parathyroidectomy (CMS/HCC)    • Hyperlipidemia    • Klinefelter's syndrome    • Male hypogonadism    • Mixed hyperlipidemia    • Overweight     body mass index 25-29 overweight-Abstracted from [facility]   • Peripheral nerve disease    • Stress fracture of foot 06/2014    RT foot   • Thyroid function test abnormal    • Tinea pedis    • Type 2 diabetes mellitus with unspecified complications (CMS/HCC) 2006   • Type II diabetes mellitus with neurological manifestations (CMS/HCC)     Diabetes mellitus, type II, with neurological complications-Abstracted from [facility]   • Vitamin D deficiency       Past Surgical History:   Procedure Laterality Date   • FOOT FRACTURE SURGERY Right 08/2016    Rt. foot fx and surgery 8/2016-Abstracted from [facility]   • PARATHYROIDECTOMY        Family History   Problem Relation Age of Onset   • Alzheimer's disease Mother    • Dementia Mother    • Diabetes Mother    • Cancer Father    • Diabetes Father    • Heart disease Father         Review of Systems   Constitutional: Negative.    HENT:  "Negative.    Eyes: Negative.    Respiratory: Negative.    Cardiovascular: Negative.    Gastrointestinal: Negative.    Endocrine: Negative.    Genitourinary: Negative.    Musculoskeletal: Positive for gait problem.   Skin: Negative.    Allergic/Immunologic: Negative.    Neurological: Positive for numbness.   Hematological: Bruises/bleeds easily.   Psychiatric/Behavioral: Negative.      Objective:    /70   Pulse 58   Temp 97.7 °F (36.5 °C)   Ht 200.7 cm (79\")   SpO2 97%   BMI 26.52 kg/m²     Neurology Exam:  General apperance: NAD.     Mental status: Alert, awake and oriented to time place and person.    Recent and Remote memory: Can recall 3/3 objects at 5 minutes. Can recall historical events.     Attention span and Concentration: Serial 7s: Normal.     Fund of knowledge:  Normal.     Language and Speech: No aphasia or dysarthria.    Naming , Repitition and Comprehension:  Can name objects, repeat a sentence and follow commands. Speech is clear and fluent with good repetition, comprehension, and naming.    CN II to XII: Intact.    Opthalmoscopic Exam: No papilledema.    Motor:  Right UE muscle strength 5-/5. Normal tone.     Left UE muscle strength 5-/5. Normal tone.      Right LE muscle strength 5-/5. Normal tone.     Left LE muscle strength 5-/5. Normal tone.      Sensory: Reduced light touch, vibration and pinprick sensation bilaterally.    DTRs: 1+ bilaterally in upper extremities, absent bilateral knee and absent bilateral ankles.    Babinski: Negative bilaterally.    Co-ordination: Normal finger-to-nose, heel to shin B/L.    Rhomberg: Positive.    Gait: Unsteady gait, walks slowly with the help of a cane.    Cardiovascular: Regular rate and rhythm without murmur, gallop or rub.    Assessment and Plan:  1. Neuropathy  Patient with severe diabetic neuropathy with both axonal and demyelinating features.  He is in clinic in follow-up after 1 and half years and in the last 1 and half years, he has noted " significant decline in his balance, gait and ability to walk.  He is now using cane to walk.  Because of this decline, he is planning to apply for disability.  I will be changing gabapentin 300 mg 3 times daily scheduled dose for better therapeutic effect.  Continue to maintain strict glycemic control and I will plan to see him back in clinic in 4 months for follow-up.      - gabapentin (NEURONTIN) 300 MG capsule; Take 1 capsule three times a day.  Dispense: 270 capsule; Refill: 1       I spent 25 minutes face to face with the patient and spent more than 50% of this time  in management, instructions and education regarding above mentioned diagnosis and also on counseling and discussing about taking medication regularly, possible side effects with medication use, importance of good sleep hygiene, good hydration and regular exercise.    No follow-ups on file.

## 2021-05-17 ENCOUNTER — TELEPHONE (OUTPATIENT)
Dept: ENDOCRINOLOGY | Facility: CLINIC | Age: 66
End: 2021-05-17

## 2021-05-17 DIAGNOSIS — E11.8 TYPE 2 DIABETES MELLITUS WITH COMPLICATIONS (HCC): Primary | ICD-10-CM

## 2021-05-17 RX ORDER — EMPAGLIFLOZIN 25 MG/1
25 TABLET, FILM COATED ORAL EVERY MORNING
Qty: 30 TABLET | Refills: 2 | Status: SHIPPED | OUTPATIENT
Start: 2021-05-17 | End: 2021-07-20 | Stop reason: SDUPTHER

## 2021-05-17 NOTE — TELEPHONE ENCOUNTER
Please ask for some recent blood sugar readings.  Appears that Invokana is not covered on his insurance.  I sent in Rx for Jardiance 25 mg daily instead.  Please let him know.  Thank you.

## 2021-05-17 NOTE — TELEPHONE ENCOUNTER
Pt called and said he needs to start taking invokana again. His blood sugar keeps going up and he is also not able to exercise. Please send rx to [facility] pharmacy,

## 2021-05-25 ENCOUNTER — OFFICE VISIT (OUTPATIENT)
Dept: NEUROLOGY | Facility: CLINIC | Age: 66
End: 2021-05-25

## 2021-05-25 ENCOUNTER — TELEPHONE (OUTPATIENT)
Dept: NEUROLOGY | Facility: CLINIC | Age: 66
End: 2021-05-25

## 2021-05-25 VITALS
WEIGHT: 234 LBS | TEMPERATURE: 97.3 F | HEIGHT: 78 IN | SYSTOLIC BLOOD PRESSURE: 120 MMHG | BODY MASS INDEX: 27.07 KG/M2 | OXYGEN SATURATION: 96 % | DIASTOLIC BLOOD PRESSURE: 62 MMHG | HEART RATE: 72 BPM

## 2021-05-25 DIAGNOSIS — G62.9 NEUROPATHY: Primary | ICD-10-CM

## 2021-05-25 DIAGNOSIS — G62.9 NEUROPATHY: ICD-10-CM

## 2021-05-25 PROCEDURE — 99214 OFFICE O/P EST MOD 30 MIN: CPT | Performed by: PSYCHIATRY & NEUROLOGY

## 2021-05-25 RX ORDER — GABAPENTIN 300 MG/1
600 CAPSULE ORAL 3 TIMES DAILY
Qty: 180 CAPSULE | Refills: 4 | Status: SHIPPED | OUTPATIENT
Start: 2021-05-25 | End: 2021-09-28

## 2021-05-25 RX ORDER — ATORVASTATIN CALCIUM 10 MG/1
10 TABLET, FILM COATED ORAL DAILY
COMMUNITY
Start: 2021-03-20

## 2021-05-25 RX ORDER — BRIMONIDINE TARTRATE/TIMOLOL 0.2%-0.5%
DROPS OPHTHALMIC (EYE)
COMMUNITY
Start: 2021-03-03 | End: 2021-09-28

## 2021-05-25 RX ORDER — BRIMONIDINE TARTRATE 2 MG/ML
SOLUTION/ DROPS OPHTHALMIC
COMMUNITY
Start: 2021-03-26 | End: 2021-09-28

## 2021-05-25 RX ORDER — TIMOLOL MALEATE 5 MG/ML
SOLUTION/ DROPS OPHTHALMIC
COMMUNITY
Start: 2021-03-26 | End: 2021-09-28

## 2021-05-25 NOTE — PROGRESS NOTES
Subjective:    CC: Tom Blair is in clinic today for follow up for neuropathy.    HPI:  Follow-up: 4/13/2019: He is in clinic for regular follow-up.  Since her last visit, he reports that some days in the afternoon, pain, tingling and numbness is worse and is requesting to increase gabapentin afternoon dose to 300 mg.  He continues to get physical therapy and that seems to be keeping things under control.  No falls since the last visit.  He continues to use AFO on the left which helps with balance.  He finally underwent EMG/nerve conduction study at  which I reviewed personally it showed mixed motor and sensory,  axonal and demyelinating severe generalized neuropathy.  MRI brain was done as well and it did not reveal any acute intracranial abnormalities.  He continues to have intermittent muscle twitching in both the calf muscles.    Follow-up: 1/26/2021: He is in clinic for regular follow-up.  He is in follow-up after nearly 1 and half years.  In last 1 and half years, he reports that he has had significant deterioration as far as his balance, walking is concerned because of worsening in neuropathy symptoms.  He reports that he has to now use cane to walk.  He is unable to walk for prolonged.  Of time and has to take frequent breaks while walking.  He continues to take gabapentin 2 to 300 mg in the morning, 300 mg in the afternoon and 2 to 300 mg at night.    Follow-up: 5/25/2021: He is in clinic for regular follow-up.  Since his last visit in January, he is now taking gabapentin 300 mg 3 times daily consistently.  He reports that it has helped with the symptoms but he still has tingling numbness and pain around his ankles which is quite severe.  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  He is now scheduled to have CT abdomen this next Thursday for further evaluation.    The following portions of  the patient's history were reviewed and updated as of 05/25/2021: allergies, social history and problem list.       Current Outpatient Medications:   •  atorvastatin (LIPITOR) 10 MG tablet, , Disp: , Rfl:   •  brimonidine (ALPHAGAN) 0.2 % ophthalmic solution, , Disp: , Rfl:   •  Bydureon BCise 2 MG/0.85ML auto-injector injection, Inject 0.85 mL under the skin into the appropriate area as directed 1 (One) Time Per Week., Disp: 10.2 mL, Rfl: 3  •  Combigan 0.2-0.5 % ophthalmic solution, , Disp: , Rfl:   •  Eliquis 5 MG tablet tablet, , Disp: , Rfl:   •  FORTEO 600 MCG/2.4ML injection, , Disp: , Rfl:   •  gabapentin (NEURONTIN) 300 MG capsule, Take 1 capsule three times a day., Disp: 270 capsule, Rfl: 1  •  glucose blood (OneTouch Verio) test strip, Testing 1x per day, Disp: 100 each, Rfl: 3  •  Jardiance 25 MG tablet, Take 25 mg by mouth Every Morning., Disp: 30 tablet, Rfl: 2  •  metFORMIN (GLUCOPHAGE-XR) 500 MG 24 hr tablet, Take 2 tablets by mouth 2 (Two) Times a Day., Disp: 360 tablet, Rfl: 3  •  multivitamin with minerals tablet tablet, Take 1 tablet by mouth Daily., Disp: , Rfl:   •  sertraline (ZOLOFT) 100 MG tablet, Take  by mouth Daily., Disp: , Rfl:   •  sotalol (BETAPACE) 120 MG tablet, , Disp: , Rfl:   •  ULTICARE MINI PEN NEEDLES 31G X 6 MM misc, , Disp: , Rfl:   •  timolol (TIMOPTIC) 0.5 % ophthalmic solution, , Disp: , Rfl:    Past Medical History:   Diagnosis Date   • Body mass index (BMI) of 25.0-25.9 in adult    • Cancer (CMS/HCC)    • Diabetes mellitus (CMS/HCC)    • Diabetic foot ulcer (CMS/HCC)    • Disorder associated with type 2 diabetes mellitus (CMS/HCC)    • Disorder of nervous system     Disorder of nervous system due to type 2 diabetes mellitus-Abstracted from [place]   • Dyslipidemia    • Foot drop, left    • Glaucoma    • History of kidney stones    • History of parathyroidectomy (CMS/HCC)    • Hyperlipidemia    • Klinefelter's syndrome    • Male hypogonadism    • Mixed hyperlipidemia    •  "Overweight     body mass index 25-29 overweight-Abstracted from [place]   • Peripheral nerve disease    • Renal cancer (CMS/HCC)    • Stress fracture of foot 06/2014    RT foot   • Thyroid function test abnormal    • Tinea pedis    • Type 2 diabetes mellitus with unspecified complications (CMS/HCC) 2006   • Type II diabetes mellitus with neurological manifestations (CMS/[facility])     Diabetes mellitus, type II, with neurological complications-Abstracted from [facility]   • Vitamin D deficiency       Past Surgical History:   Procedure Laterality Date   • FOOT FRACTURE SURGERY Right 08/2016    Rt. foot fx and surgery 8/2016-Abstracted from [facility]   • PARATHYROIDECTOMY        Family History   Problem Relation Age of Onset   • Alzheimer's disease Mother    • Dementia Mother    • Diabetes Mother    • Cancer Father    • Diabetes Father    • Heart disease Father         Review of Systems   Constitutional: Negative.    HENT: Negative.    Eyes: Negative.    Respiratory: Negative.    Cardiovascular: Negative.    Gastrointestinal: Negative.    Endocrine: Negative.    Genitourinary: Negative.    Musculoskeletal: Positive for gait problem.   Skin: Negative.    Allergic/Immunologic: Negative.    Neurological: Positive for numbness.   Hematological: Negative.    Psychiatric/Behavioral: Negative.      Objective:    /62   Pulse 72   Temp 97.3 °F (36.3 °C)   Ht 200.7 cm (79\")   Wt 106 kg (234 lb)   SpO2 96%   BMI 26.36 kg/m²     Neurology Exam:  General apperance: NAD.     Mental status: Alert, awake and oriented to time place and person.    Recent and Remote memory: Can recall 3/3 objects at 5 minutes. Can recall historical events.     Attention span and Concentration: Serial 7s: Normal.     Fund of knowledge:  Normal.     Language and Speech: No aphasia or dysarthria.    Naming , Repitition and Comprehension:  Can name objects, repeat a sentence and follow commands. Speech is clear and fluent with good repetition, " comprehension, and naming.    CN II to XII: Intact.    Opthalmoscopic Exam: No papilledema.    Motor:  Right UE muscle strength 5/5. Normal tone.     Left UE muscle strength 5/5. Normal tone.      Right LE muscle strength5/5. Normal tone.     Left LE muscle strength 5/5. Normal tone.      Sensory: Reduced light touch, vibration and pinprick sensation bilaterally.    DTRs: 1 + bilaterally in upper extremities, 1+ bilateral knee and absent bilateral ankles.    Babinski: Negative bilaterally.    Co-ordination: Normal finger-to-nose, heel to shin B/L.    Rhomberg: Negative.    Gait: Normal.    Cardiovascular: Regular rate and rhythm without murmur, gallop or rub.    Assessment and Plan:  1. Neuropathy  Overall, he has responded better with gabapentin 300 mg 3 times daily dose but he still has some days where he gets dysesthesias and paresthesias around both his ankles and feet.  Since he denies any side effects with gabapentin use, it will be increased to 600 mg 3 times daily dosing next 3 weeks for better therapeutic effect.  I will plan to see him in clinic in 4 months for follow-up.    I spent 30 minutes face to face with the patient and spent more than 50% of this time  in management, instructions and education regarding above mentioned diagnosis and also on counseling and discussing about taking medication regularly, possible side effects with medication use, importance of good sleep hygiene, good hydration and regular exercise.    Return in about 4 months (around 9/25/2021).

## 2021-05-25 NOTE — TELEPHONE ENCOUNTER
Pharmacy Name: [facility]     Pharmacy representative name: ALIA    Pharmacy representative phone number: [phone]    What medication are you calling in regards to: GABAPENTIN    What question does the pharmacy have: THE GABAPENTIN HAS TWO DIFFERENT DIRECTIONS ON IT. ONE SAYS TO TAKE TWO TABLETS THREE TIMES A DAY AND THE OTHER SAYS TO TAKE ONE TABLET THREE TIMES A DAY. PLEASE CONFIRM ON WHICH ONE IS RIGHT.    Who is the provider that prescribed the medication: DR BAHENA    Additional notes: PLEASE GIVE THEM A CALL BACK TO DISCUSS THIS.

## 2021-07-20 ENCOUNTER — OFFICE VISIT (OUTPATIENT)
Dept: ENDOCRINOLOGY | Facility: CLINIC | Age: 66
End: 2021-07-20

## 2021-07-20 VITALS
BODY MASS INDEX: 25.82 KG/M2 | DIASTOLIC BLOOD PRESSURE: 64 MMHG | HEART RATE: 67 BPM | WEIGHT: 223.2 LBS | SYSTOLIC BLOOD PRESSURE: 100 MMHG | HEIGHT: 78 IN | OXYGEN SATURATION: 94 %

## 2021-07-20 DIAGNOSIS — E11.65 TYPE 2 DIABETES MELLITUS WITH HYPERGLYCEMIA, WITHOUT LONG-TERM CURRENT USE OF INSULIN (HCC): Primary | Chronic | ICD-10-CM

## 2021-07-20 DIAGNOSIS — E11.49 TYPE II DIABETES MELLITUS WITH NEUROLOGICAL MANIFESTATIONS (HCC): ICD-10-CM

## 2021-07-20 DIAGNOSIS — Z86.31 HISTORY OF DIABETIC ULCER OF FOOT: ICD-10-CM

## 2021-07-20 LAB
EXPIRATION DATE: ABNORMAL
EXPIRATION DATE: NORMAL
GLUCOSE BLDC GLUCOMTR-MCNC: 194 MG/DL (ref 70–130)
HBA1C MFR BLD: 7 %
Lab: ABNORMAL
Lab: NORMAL

## 2021-07-20 PROCEDURE — 83036 HEMOGLOBIN GLYCOSYLATED A1C: CPT | Performed by: PHYSICIAN ASSISTANT

## 2021-07-20 PROCEDURE — 99213 OFFICE O/P EST LOW 20 MIN: CPT | Performed by: PHYSICIAN ASSISTANT

## 2021-07-20 PROCEDURE — 82947 ASSAY GLUCOSE BLOOD QUANT: CPT | Performed by: PHYSICIAN ASSISTANT

## 2021-07-20 RX ORDER — DIPHENOXYLATE HYDROCHLORIDE AND ATROPINE SULFATE 2.5; .025 MG/1; MG/1
1 TABLET ORAL DAILY
COMMUNITY
End: 2021-09-28

## 2021-07-20 RX ORDER — EMPAGLIFLOZIN 25 MG/1
25 TABLET, FILM COATED ORAL EVERY MORNING
Qty: 90 TABLET | Refills: 3 | Status: SHIPPED | OUTPATIENT
Start: 2021-07-20 | End: 2022-04-20 | Stop reason: SDUPTHER

## 2021-07-20 RX ORDER — EXENATIDE 2 MG/.85ML
2 INJECTION, SUSPENSION, EXTENDED RELEASE SUBCUTANEOUS WEEKLY
Qty: 10.2 ML | Refills: 3 | Status: SHIPPED | OUTPATIENT
Start: 2021-07-20 | End: 2022-04-20 | Stop reason: SDUPTHER

## 2021-07-20 RX ORDER — SOTALOL HYDROCHLORIDE 80 MG/1
80 TABLET ORAL 2 TIMES DAILY
COMMUNITY
Start: 2021-06-29

## 2021-07-20 RX ORDER — METFORMIN HYDROCHLORIDE 500 MG/1
1000 TABLET, EXTENDED RELEASE ORAL 2 TIMES DAILY
Qty: 360 TABLET | Refills: 3 | Status: SHIPPED | OUTPATIENT
Start: 2021-07-20 | End: 2021-12-02 | Stop reason: SDUPTHER

## 2021-07-20 RX ORDER — TIMOLOL 5.12 MG/ML
1 SOLUTION/ DROPS OPHTHALMIC
COMMUNITY
End: 2021-09-28

## 2021-09-28 ENCOUNTER — OFFICE VISIT (OUTPATIENT)
Dept: NEUROLOGY | Facility: CLINIC | Age: 66
End: 2021-09-28

## 2021-09-28 VITALS — HEIGHT: 78 IN | WEIGHT: 211 LBS | OXYGEN SATURATION: 97 % | HEART RATE: 69 BPM | BODY MASS INDEX: 24.41 KG/M2

## 2021-09-28 DIAGNOSIS — G62.9 NEUROPATHY: Primary | ICD-10-CM

## 2021-09-28 PROCEDURE — 99214 OFFICE O/P EST MOD 30 MIN: CPT | Performed by: PSYCHIATRY & NEUROLOGY

## 2021-09-28 NOTE — PROGRESS NOTES
Subjective:    CC: Tom Blair is in clinic today for follow up for diabetic neuropathy.    HPI:  Follow-up: 4/13/2019: He is in clinic for regular follow-up.  Since her last visit, he reports that some days in the afternoon, pain, tingling and numbness is worse and is requesting to increase gabapentin afternoon dose to 300 mg.  He continues to get physical therapy and that seems to be keeping things under control.  No falls since the last visit.  He continues to use AFO on the left which helps with balance.  He finally underwent EMG/nerve conduction study at  which I reviewed personally it showed mixed motor and sensory,  axonal and demyelinating severe generalized neuropathy.  MRI brain was done as well and it did not reveal any acute intracranial abnormalities.  He continues to have intermittent muscle twitching in both the calf muscles.    Follow-up: 1/26/2021: He is in clinic for regular follow-up.  He is in follow-up after nearly 1 and half years.  In last 1 and half years, he reports that he has had significant deterioration as far as his balance, walking is concerned because of worsening in neuropathy symptoms.  He reports that he has to now use cane to walk.  He is unable to walk for prolonged.  Of time and has to take frequent breaks while walking.  He continues to take gabapentin 2 to 300 mg in the morning, 300 mg in the afternoon and 2 to 300 mg at night.    Follow-up: 5/25/2021: He is in clinic for regular follow-up.  Since his last visit in January, he is now taking gabapentin 300 mg 3 times daily consistently.  He reports that it has helped with the symptoms but he still has tingling numbness and pain around his ankles which is quite severe.  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  He is now scheduled to have CT abdomen this next Thursday for further evaluation.    Follow-up:  9/28/2021: He is in clinic for regular follow-up.  Since his last visit in May, he was diagnosed with renal cell carcinoma of the right kidney and underwent right nephrectomy and is being followed by hematooncology.  He is also on chemotherapy for the same and currently under remission responding well to the treatment.  Since his last visit, he has lost significant amount of weight-approximately 30 pounds.  He is now taking gabapentin 600 mg 3 times daily instead during daytime he takes only 300 mg dose and at nighttime take 600 mg dose.  He reports that if possible he wants to reduce gabapentin further and wants to try capsaicin cream or patch on his legs and feet and see if it works better for him.  He denies any side effects with gabapentin use.    The following portions of the patient's history were reviewed and updated as of 09/28/2021: allergies, social history and problem list.       Current Outpatient Medications:   •  atorvastatin (LIPITOR) 10 MG tablet, , Disp: , Rfl:   •  Bydureon BCise 2 MG/0.85ML auto-injector injection, Inject 0.85 mL under the skin into the appropriate area as directed 1 (One) Time Per Week., Disp: 10.2 mL, Rfl: 3  •  Eliquis 5 MG tablet tablet, , Disp: , Rfl:   •  gabapentin (NEURONTIN) 300 MG capsule, Take 2 capsules by mouth 3 (Three) Times a Day for 30 days. Take 1 capsule three times a day., Disp: 180 capsule, Rfl: 4  •  glucose blood (OneTouch Verio) test strip, Testing 1x per day, Disp: 100 each, Rfl: 3  •  Jardiance 25 MG tablet tablet, Take 1 tablet by mouth Every Morning., Disp: 90 tablet, Rfl: 3  •  metFORMIN ER (GLUCOPHAGE-XR) 500 MG 24 hr tablet, Take 2 tablets by mouth 2 (Two) Times a Day., Disp: 360 tablet, Rfl: 3  •  multivitamin with minerals tablet tablet, Take 1 tablet by mouth Daily., Disp: , Rfl:   •  sertraline (ZOLOFT) 100 MG tablet, Take  by mouth Daily., Disp: , Rfl:   •  ULTICARE MINI PEN NEEDLES 31G X 6 MM misc, , Disp: , Rfl:   •  Capsaicin-Cleansing Gel 8  "% kit, Apply 2-3 times a day as needed., Disp: 1 kit, Rfl: 3  •  sotalol (BETAPACE) 80 MG tablet, Take 80 mg by mouth 2 (two) times a day., Disp: , Rfl:    Past Medical History:   Diagnosis Date   • Cancer (CMS/HCC)    • Diabetic foot ulcer (CMS/[facility])    • Disorder of nervous system     Disorder of nervous system due to type 2 diabetes mellitus-Abstracted from [facility]   • Dyslipidemia    • Foot drop, left    • Glaucoma    • History of kidney stones    • History of parathyroidectomy (CMS/[facility])    • Hyperlipidemia    • Klinefelter's syndrome    • Male hypogonadism    • Mixed hyperlipidemia    • Overweight     body mass index 25-29   • Peripheral nerve disease    • Renal cancer (CMS/HCC)    • Stress fracture of foot 06/2014    RT foot   • Thyroid function test abnormal    • Tinea pedis    • Type 2 diabetes mellitus with unspecified complications (CMS/HCC) 2006   • Type II diabetes mellitus with neurological manifestations (CMS/[facility])     Diabetes mellitus, type II, with neurological complications-Abstracted from [facility]   • Vitamin D deficiency       Past Surgical History:   Procedure Laterality Date   • FOOT FRACTURE SURGERY Right 08/2016    Rt. foot fx and surgery 8/2016-Abstracted from [facility]   • NEPHRECTOMY     • PARATHYROIDECTOMY        Family History   Problem Relation Age of Onset   • Alzheimer's disease Mother    • Dementia Mother    • Diabetes Mother    • Cancer Father    • Diabetes Father    • Heart disease Father         Review of Systems   Constitutional: Negative.    HENT: Negative.    Eyes: Negative.    Respiratory: Negative.    Cardiovascular: Negative.    Gastrointestinal: Negative.    Endocrine: Negative.    Genitourinary: Negative.    Musculoskeletal: Negative.    Skin: Negative.    Allergic/Immunologic: Negative.    Neurological: Positive for numbness.   Hematological: Negative.    Psychiatric/Behavioral: Negative.      Objective:    Pulse 69   Ht 200.7 cm (79\")   Wt 95.7 kg (211 lb)   SpO2 " 97%   BMI 23.77 kg/m²     Neurology Exam:  General apperance: NAD.     Mental status: Alert, awake and oriented to time place and person.    Recent and Remote memory: Can recall 3/3 objects at 5 minutes. Can recall historical events.     Attention span and Concentration: Serial 7s: Normal.     Fund of knowledge:  Normal.     Language and Speech: No aphasia or dysarthria.    Naming , Repitition and Comprehension:  Can name objects, repeat a sentence and follow commands. Speech is clear and fluent with good repetition, comprehension, and naming.    CN II to XII: Intact.    Opthalmoscopic Exam: No papilledema.    Motor:  Right UE muscle strength 5/5. Normal tone.     Left UE muscle strength 5/5. Normal tone.      Right LE muscle strength5/5. Normal tone.     Left LE muscle strength 5/5. Normal tone.      Sensory: Reduced light touch, vibration and pinprick sensation bilaterally.    DTRs: 1+ bilaterally.    Babinski: Negative bilaterally.    Co-ordination: Normal finger-to-nose, heel to shin B/L.    Rhomberg: Negative.    Gait: Normal.    Cardiovascular: Regular rate and rhythm without murmur, gallop or rub.    Assessment and Plan:  1. Neuropathy  Diabetic neuropathy none patient with known history of diabetes.  He was diagnosed with renal cell carcinoma of right kidney and is now status post right nephrectomy.  He has lost approximately 30 pounds since his last visit and hence has been taking lower dose of gabapentin.  Is interested in trying capsaicin cream.  I will send prescription for the same.  If it is not approved by insurance then plan is to consider compounded cream to be applied locally to help with neuropathy symptoms.  He is also interested in having taking part in trial for Qutenza capsaicin patch.  It is not approved in [place] but clinical trial is ongoing.  We will plan to see him back in clinic in 3 months for follow-up.       I spent 30 minutes face to face with the patient and spent more than 50% of this  time  in management, instructions and education regarding above mentioned diagnosis and also on counseling and discussing about taking medication regularly, possible side effects with medication use, importance of good sleep hygiene, good hydration and regular exercise.    Return in about 3 months (around 12/28/2021).

## 2021-09-29 ENCOUNTER — TELEPHONE (OUTPATIENT)
Dept: NEUROLOGY | Facility: CLINIC | Age: 66
End: 2021-09-29

## 2021-10-01 NOTE — TELEPHONE ENCOUNTER
I will give you orders to be faxed to [facility].  Thanks can you also please let patient know that someone from the pharmacy will contact him.  Thanks

## 2021-10-05 ENCOUNTER — TELEPHONE (OUTPATIENT)
Dept: NEUROLOGY | Facility: OTHER | Age: 66
End: 2021-10-05

## 2021-10-20 ENCOUNTER — OFFICE VISIT (OUTPATIENT)
Dept: ENDOCRINOLOGY | Facility: CLINIC | Age: 66
End: 2021-10-20

## 2021-10-20 VITALS
DIASTOLIC BLOOD PRESSURE: 78 MMHG | HEIGHT: 78 IN | OXYGEN SATURATION: 98 % | HEART RATE: 63 BPM | SYSTOLIC BLOOD PRESSURE: 126 MMHG | WEIGHT: 223 LBS | BODY MASS INDEX: 25.8 KG/M2

## 2021-10-20 DIAGNOSIS — L97.511 DIABETIC ULCER OF TOE OF RIGHT FOOT ASSOCIATED WITH TYPE 2 DIABETES MELLITUS, LIMITED TO BREAKDOWN OF SKIN (HCC): ICD-10-CM

## 2021-10-20 DIAGNOSIS — E11.49 TYPE II DIABETES MELLITUS WITH NEUROLOGICAL MANIFESTATIONS (HCC): ICD-10-CM

## 2021-10-20 DIAGNOSIS — E11.621 DIABETIC ULCER OF TOE OF RIGHT FOOT ASSOCIATED WITH TYPE 2 DIABETES MELLITUS, LIMITED TO BREAKDOWN OF SKIN (HCC): ICD-10-CM

## 2021-10-20 DIAGNOSIS — E11.8 TYPE 2 DIABETES MELLITUS WITH COMPLICATIONS (HCC): Primary | ICD-10-CM

## 2021-10-20 LAB
EXPIRATION DATE: ABNORMAL
EXPIRATION DATE: NORMAL
GLUCOSE BLDC GLUCOMTR-MCNC: 153 MG/DL (ref 70–130)
HBA1C MFR BLD: 6.2 %
Lab: ABNORMAL
Lab: NORMAL

## 2021-10-20 PROCEDURE — 99214 OFFICE O/P EST MOD 30 MIN: CPT | Performed by: PHYSICIAN ASSISTANT

## 2021-10-20 PROCEDURE — 83036 HEMOGLOBIN GLYCOSYLATED A1C: CPT | Performed by: PHYSICIAN ASSISTANT

## 2021-10-20 PROCEDURE — 82947 ASSAY GLUCOSE BLOOD QUANT: CPT | Performed by: PHYSICIAN ASSISTANT

## 2021-10-20 RX ORDER — VENLAFAXINE HYDROCHLORIDE 75 MG/1
75 CAPSULE, EXTENDED RELEASE ORAL DAILY
COMMUNITY
Start: 2021-09-29 | End: 2022-09-29

## 2021-10-20 RX ORDER — LIDOCAINE AND PRILOCAINE 25; 25 MG/G; MG/G
CREAM TOPICAL AS NEEDED
COMMUNITY
Start: 2021-10-04 | End: 2021-12-03 | Stop reason: SDUPTHER

## 2021-10-20 RX ORDER — TIMOLOL MALEATE 5 MG/ML
SOLUTION/ DROPS OPHTHALMIC 2 TIMES DAILY
COMMUNITY
Start: 2021-08-31

## 2021-12-02 DIAGNOSIS — E11.65 TYPE 2 DIABETES MELLITUS WITH HYPERGLYCEMIA, WITHOUT LONG-TERM CURRENT USE OF INSULIN (HCC): Chronic | ICD-10-CM

## 2021-12-02 RX ORDER — METFORMIN HYDROCHLORIDE 500 MG/1
1000 TABLET, EXTENDED RELEASE ORAL 2 TIMES DAILY
Qty: 360 TABLET | Refills: 3 | Status: SHIPPED | OUTPATIENT
Start: 2021-12-02

## 2021-12-02 NOTE — TELEPHONE ENCOUNTER
----- Message from Tom Blair sent at 12/2/2021  9:40 AM EST -----  Regarding: Prescription ‘s are out of Metformin  Please send refill prescription to  [facility] for Metformin 500 mg. 3 months supply. I am out of refills.    Tom Blair  Birthday- July 31, 1955    Thank you,    Tom Blair

## 2021-12-03 RX ORDER — LIDOCAINE AND PRILOCAINE 25; 25 MG/G; MG/G
CREAM TOPICAL AS NEEDED
Qty: 180 G | Refills: 3 | Status: SHIPPED | OUTPATIENT
Start: 2021-12-03 | End: 2022-12-03

## 2021-12-06 ENCOUNTER — TELEPHONE (OUTPATIENT)
Dept: NEUROLOGY | Facility: CLINIC | Age: 66
End: 2021-12-06

## 2021-12-06 NOTE — TELEPHONE ENCOUNTER
Have I done any disability paperwork for him in the past?  If not then we can tell him that I usually do not do any disability paperwork.

## 2021-12-06 NOTE — TELEPHONE ENCOUNTER
Provider:   Caller: PATIENT  Relationship to Patient: SELF  Pharmacy: NA  Phone Number: [phone]  Reason for Call: PATIENT STATES HE IS FAXING OVER DISABILITY PAPERWORK DUE TO A DENIAL  When was the patient last seen: 9-28-21

## 2022-04-06 ENCOUNTER — DOCUMENTATION (OUTPATIENT)
Dept: ENDOCRINOLOGY | Facility: CLINIC | Age: 67
End: 2022-04-06

## 2022-04-06 NOTE — PROGRESS NOTES
Patient not eligible to fill specialty medication at [facility]. Reason:     Pt works at  and is filling at a  pharmacy    April Shawn Ochoa  Pharmacy Care Coordinator  4/6/2022  11:10 EDT

## 2022-04-20 ENCOUNTER — OFFICE VISIT (OUTPATIENT)
Dept: ENDOCRINOLOGY | Facility: CLINIC | Age: 67
End: 2022-04-20

## 2022-04-20 VITALS
OXYGEN SATURATION: 100 % | BODY MASS INDEX: 25.11 KG/M2 | WEIGHT: 217 LBS | DIASTOLIC BLOOD PRESSURE: 70 MMHG | HEART RATE: 76 BPM | HEIGHT: 78 IN | SYSTOLIC BLOOD PRESSURE: 138 MMHG

## 2022-04-20 DIAGNOSIS — L97.511 DIABETIC ULCER OF TOE OF RIGHT FOOT ASSOCIATED WITH TYPE 2 DIABETES MELLITUS, LIMITED TO BREAKDOWN OF SKIN: ICD-10-CM

## 2022-04-20 DIAGNOSIS — E11.65 TYPE 2 DIABETES MELLITUS WITH HYPERGLYCEMIA, WITHOUT LONG-TERM CURRENT USE OF INSULIN: Primary | Chronic | ICD-10-CM

## 2022-04-20 DIAGNOSIS — E11.621 DIABETIC ULCER OF TOE OF RIGHT FOOT ASSOCIATED WITH TYPE 2 DIABETES MELLITUS, LIMITED TO BREAKDOWN OF SKIN: ICD-10-CM

## 2022-04-20 LAB
EXPIRATION DATE: NORMAL
EXPIRATION DATE: NORMAL
GLUCOSE BLDC GLUCOMTR-MCNC: 113 MG/DL (ref 70–130)
HBA1C MFR BLD: 6.2 %
Lab: NORMAL
Lab: NORMAL

## 2022-04-20 PROCEDURE — 82947 ASSAY GLUCOSE BLOOD QUANT: CPT | Performed by: PHYSICIAN ASSISTANT

## 2022-04-20 PROCEDURE — 99213 OFFICE O/P EST LOW 20 MIN: CPT | Performed by: PHYSICIAN ASSISTANT

## 2022-04-20 PROCEDURE — 83036 HEMOGLOBIN GLYCOSYLATED A1C: CPT | Performed by: PHYSICIAN ASSISTANT

## 2022-04-20 RX ORDER — EXENATIDE 2 MG/.85ML
2 INJECTION, SUSPENSION, EXTENDED RELEASE SUBCUTANEOUS WEEKLY
Qty: 10.2 ML | Refills: 2 | Status: SHIPPED | OUTPATIENT
Start: 2022-04-20 | End: 2022-08-08

## 2022-04-20 RX ORDER — EMPAGLIFLOZIN 25 MG/1
25 TABLET, FILM COATED ORAL EVERY MORNING
Qty: 90 TABLET | Refills: 2 | Status: SHIPPED | OUTPATIENT
Start: 2022-04-20

## 2022-07-08 ENCOUNTER — TELEPHONE (OUTPATIENT)
Dept: ENDOCRINOLOGY | Facility: CLINIC | Age: 67
End: 2022-07-08

## 2022-07-08 NOTE — TELEPHONE ENCOUNTER
Onetouch Verio placed at .  Tried to call patient and there was no answer.  Left message that glucometer was up front.

## 2022-07-08 NOTE — TELEPHONE ENCOUNTER
Pt called needs a new monitor One Touch -it's not acting right and isn't charging.  Do you have one here you can give him ?   If not will need a prescription called in.   Please advise.  Thank you.

## 2022-08-08 DIAGNOSIS — E11.65 TYPE 2 DIABETES MELLITUS WITH HYPERGLYCEMIA, WITHOUT LONG-TERM CURRENT USE OF INSULIN: Chronic | ICD-10-CM

## 2022-08-08 RX ORDER — EXENATIDE 2 MG/.85ML
INJECTION, SUSPENSION, EXTENDED RELEASE SUBCUTANEOUS
Qty: 10.2 ML | Refills: 0 | Status: SHIPPED | OUTPATIENT
Start: 2022-08-08

## 2022-08-16 DIAGNOSIS — E11.65 TYPE 2 DIABETES MELLITUS WITH HYPERGLYCEMIA, WITHOUT LONG-TERM CURRENT USE OF INSULIN: Chronic | ICD-10-CM

## 2022-08-17 RX ORDER — EMPAGLIFLOZIN 25 MG/1
25 TABLET, FILM COATED ORAL EVERY MORNING
Qty: 90 TABLET | Refills: 2 | OUTPATIENT
Start: 2022-08-17

## 2022-12-09 ENCOUNTER — OFFICE VISIT (OUTPATIENT)
Dept: ENDOCRINOLOGY | Facility: CLINIC | Age: 67
End: 2022-12-09

## 2022-12-09 VITALS
BODY MASS INDEX: 28.35 KG/M2 | DIASTOLIC BLOOD PRESSURE: 72 MMHG | HEIGHT: 78 IN | OXYGEN SATURATION: 97 % | HEART RATE: 69 BPM | WEIGHT: 245 LBS | SYSTOLIC BLOOD PRESSURE: 130 MMHG

## 2022-12-09 DIAGNOSIS — E11.49 TYPE II DIABETES MELLITUS WITH NEUROLOGICAL MANIFESTATIONS: ICD-10-CM

## 2022-12-09 DIAGNOSIS — E11.65 TYPE 2 DIABETES MELLITUS WITH HYPERGLYCEMIA, WITHOUT LONG-TERM CURRENT USE OF INSULIN: Primary | ICD-10-CM

## 2022-12-09 DIAGNOSIS — E78.2 MIXED HYPERLIPIDEMIA: ICD-10-CM

## 2022-12-09 LAB
EXPIRATION DATE: ABNORMAL
EXPIRATION DATE: NORMAL
GLUCOSE BLDC GLUCOMTR-MCNC: 174 MG/DL (ref 70–130)
HBA1C MFR BLD: 6.9 %
Lab: ABNORMAL
Lab: NORMAL

## 2022-12-09 PROCEDURE — 82947 ASSAY GLUCOSE BLOOD QUANT: CPT | Performed by: INTERNAL MEDICINE

## 2022-12-09 PROCEDURE — 83036 HEMOGLOBIN GLYCOSYLATED A1C: CPT | Performed by: INTERNAL MEDICINE

## 2022-12-09 PROCEDURE — 99213 OFFICE O/P EST LOW 20 MIN: CPT | Performed by: INTERNAL MEDICINE

## 2022-12-09 RX ORDER — SILDENAFIL 100 MG/1
100 TABLET, FILM COATED ORAL
COMMUNITY
Start: 2022-08-12 | End: 2023-08-12

## 2022-12-09 RX ORDER — GABAPENTIN 100 MG/1
CAPSULE ORAL
COMMUNITY
Start: 2022-11-14

## 2022-12-09 RX ORDER — TESTOSTERONE CYPIONATE 200 MG/ML
INJECTION, SOLUTION INTRAMUSCULAR
COMMUNITY
Start: 2022-10-10

## 2022-12-09 NOTE — PROGRESS NOTES
"     Office Note      Date: 2022  Patient Name: Tom Blair  MRN: [id number]  : 1955    Chief Complaint   Patient presents with   • Diabetes       History of Present Illness:   Tom Blair is a 67 y.o. male who presents for Diabetes type 2. Diagnosed in: . Treated in past with oral agents. Current treatments: bydureon, jardiance and metformin. Number of insulin shots per day: none. Checks blood sugar 1 times a week. Has low blood sugar: no. Aspirin use: No - on eliquis. Statin use: Yes. ACE-I/ARB use: No -  . Changes in health since last visit: kidney cancer - surgery and Keytruda treatment. Last eye exam .    Subjective      Diabetic Complications:  Eyes: No  Kidneys: No  Feet: Yes - on gabapentin  Heart: No    Diet and Exercise:  Meals per day: 3  Minutes of exercise per week: 120 mins.    Review of Systems:   Review of Systems   Constitutional: Negative.    Cardiovascular: Negative.    Gastrointestinal: Negative.    Endocrine: Negative.        The following portions of the patient's history were reviewed and updated as appropriate: allergies, current medications, past family history, past medical history, past social history, past surgical history and problem list.    Objective       Visit Vitals  /72   Pulse 69   Ht 200.7 cm (79\")   Wt 111 kg (245 lb)   SpO2 97%   BMI 27.60 kg/m²       Physical Exam:  Physical Exam  Constitutional:       Appearance: Normal appearance.   Neurological:      Mental Status: He is alert.         Labs:    HbA1c  Lab Results   Component Value Date    HGBA1C 6.9 2022       CMP  Lab Results   Component Value Date    GLUCOSE 143 (H) 2021    BUN 27 (H) 2022    CREATININE 1.80 (H) 2022    EGFRIFNONA 38 (L) 2022    EGFRIFAFRI 48 2022    BCR 15 2022    K 4.6 2022    CO2 22 2022    CALCIUM 10.3 (H) 2022    AST 21 2022    ALT 25 2022        Lipid Panel  Lab Results   Component Value Date    CHLPL " 156 05/18/2022    HDL 39 (L) 05/18/2022    LDL 41.6 05/18/2022    TRIG 377 (H) 05/18/2022        TSH  Lab Results   Component Value Date    FREET4 1.2 04/05/2022        Hemoglobin A1C  Lab Results   Component Value Date    HGBA1C 6.9 12/09/2022        Microalbumin/Creatinine  Lab Results   Component Value Date    MALBCRERATIO 22.7 10/09/2020    MICROALBUR 3.2 10/09/2020           Assessment / Plan      Assessment & Plan:  Diagnoses and all orders for this visit:    1. Type 2 diabetes mellitus with hyperglycemia, without long-term current use of insulin (HCC) (Primary)  Assessment & Plan:  Diabetes is worsening. A1c increased but okay at 6.9%.  Continue current treatment regimen.  Diabetes will be reassessed in 1 month.    Orders:  -     POC Glucose, Blood  -     POC Glycosylated Hemoglobin (Hb A1C)    2. 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 Mixed hyperlipidemia  Assessment & Plan:  Continue statin.  Plan to check lipids next visit.        Return in about 3 months (around 3/9/2023) for Recheck with A1c, CMP, lipids, TSH, microalbumin, foot exam.    Alvaro Paredes MD   12/09/2022

## 2022-12-09 NOTE — ASSESSMENT & PLAN NOTE
Diabetes is worsening. A1c increased but okay at 6.9%.  Continue current treatment regimen.  Diabetes will be reassessed in 1 month.

## 2023-04-24 ENCOUNTER — OFFICE VISIT (OUTPATIENT)
Dept: ENDOCRINOLOGY | Facility: CLINIC | Age: 68
End: 2023-04-24
Payer: COMMERCIAL

## 2023-04-24 VITALS
HEIGHT: 78 IN | HEART RATE: 61 BPM | BODY MASS INDEX: 26.96 KG/M2 | SYSTOLIC BLOOD PRESSURE: 120 MMHG | DIASTOLIC BLOOD PRESSURE: 74 MMHG | WEIGHT: 233 LBS | OXYGEN SATURATION: 98 %

## 2023-04-24 DIAGNOSIS — E11.49 TYPE II DIABETES MELLITUS WITH NEUROLOGICAL MANIFESTATIONS: ICD-10-CM

## 2023-04-24 DIAGNOSIS — E11.65 TYPE 2 DIABETES MELLITUS WITH HYPERGLYCEMIA, WITHOUT LONG-TERM CURRENT USE OF INSULIN: Primary | ICD-10-CM

## 2023-04-24 DIAGNOSIS — E78.2 MIXED HYPERLIPIDEMIA: ICD-10-CM

## 2023-04-24 LAB
ALBUMIN UR-MCNC: 5 MG/DL
CHOLEST SERPL-MCNC: 111 MG/DL (ref 0–200)
CREAT UR-MCNC: 115.9 MG/DL
EXPIRATION DATE: ABNORMAL
EXPIRATION DATE: NORMAL
GLUCOSE BLDC GLUCOMTR-MCNC: 152 MG/DL (ref 70–130)
HBA1C MFR BLD: 6.9 %
HDLC SERPL-MCNC: 28 MG/DL (ref 40–60)
LDLC SERPL CALC-MCNC: 45 MG/DL (ref 0–100)
LDLC/HDLC SERPL: 1.26 {RATIO}
Lab: ABNORMAL
Lab: NORMAL
MICROALBUMIN/CREAT UR: 43.1 MG/G
TRIGL SERPL-MCNC: 239 MG/DL (ref 0–150)
VLDLC SERPL-MCNC: 38 MG/DL (ref 5–40)

## 2023-04-24 PROCEDURE — 80061 LIPID PANEL: CPT | Performed by: INTERNAL MEDICINE

## 2023-04-24 PROCEDURE — 82043 UR ALBUMIN QUANTITATIVE: CPT | Performed by: INTERNAL MEDICINE

## 2023-04-24 PROCEDURE — 82570 ASSAY OF URINE CREATININE: CPT | Performed by: INTERNAL MEDICINE

## 2023-04-24 RX ORDER — METFORMIN HYDROCHLORIDE 500 MG/1
1000 TABLET, EXTENDED RELEASE ORAL 2 TIMES DAILY
Qty: 360 TABLET | Refills: 3 | Status: SHIPPED | OUTPATIENT
Start: 2023-04-24

## 2023-04-24 RX ORDER — PEN NEEDLE, DIABETIC 29 G X1/2"
NEEDLE, DISPOSABLE MISCELLANEOUS
Qty: 100 EACH | Refills: 3 | Status: SHIPPED | OUTPATIENT
Start: 2023-04-24

## 2023-04-24 RX ORDER — ATORVASTATIN CALCIUM 10 MG/1
10 TABLET, FILM COATED ORAL DAILY
Qty: 90 TABLET | Refills: 3 | Status: SHIPPED | OUTPATIENT
Start: 2023-04-24

## 2023-04-24 RX ORDER — EXENATIDE 2 MG/.85ML
2 INJECTION, SUSPENSION, EXTENDED RELEASE SUBCUTANEOUS WEEKLY
Qty: 10.2 ML | Refills: 3 | Status: SHIPPED | OUTPATIENT
Start: 2023-04-24

## 2023-04-24 NOTE — ASSESSMENT & PLAN NOTE
Foot exam today showed decreased senstation with some skin breakdown but no evidence of infection at this time.  Continue podiatry follow up.

## 2023-04-24 NOTE — PROGRESS NOTES
"     Office Note      Date: 2023  Patient Name: Tom Blair  MRN: [id number]  : 1955    Chief Complaint   Patient presents with   • Diabetes     Type II       History of Present Illness:   Tom Blair is a 67 y.o. male who presents for Diabetes type 2. Diagnosed in: . Treated in past with oral agents. Current treatments: bydureon, jardiance and metformin. Number of insulin shots per day: none. Checks blood sugar 1 times a week. Has low blood sugar: no. Aspirin use: No - on eliquis. Statin use: Yes. ACE-I/ARB use: No -  . Changes in health since last visit: kidney cancer now stage IV with involvement of adrenal gland and lungs - now on Opdivo and Y[place]voy. Last eye exam 2023.    Subjective      Diabetic Complications:  Eyes: No  Kidneys: No  Feet: Yes - on gabapentin  Heart: No    Diet and Exercise:  Meals per day: 3  Minutes of exercise per week: 120 mins.    Review of Systems:   Review of Systems   Constitutional: Negative.    Cardiovascular: Negative.    Gastrointestinal: Positive for nausea.   Endocrine: Negative.        The following portions of the patient's history were reviewed and updated as appropriate: allergies, current medications, past family history, past medical history, past social history, past surgical history and problem list.    Objective       Visit Vitals  /74 (BP Location: Left arm, Patient Position: Sitting, Cuff Size: Adult)   Pulse 61   Ht 200.7 cm (79\")   Wt 106 kg (233 lb)   SpO2 98%   BMI 26.25 kg/m²       Physical Exam:  Physical Exam  Constitutional:       Appearance: Normal appearance.   Cardiovascular:      Pulses:           Dorsalis pedis pulses are 2+ on the right side and 2+ on the left side.        Posterior tibial pulses are 2+ on the right side and 2+ on the left side.   Feet:      Right foot:      Protective Sensation: 5 sites tested. 4 sites sensed.      Skin integrity: Skin breakdown present.      Toenail Condition: Right toenails are abnormally " thick. Fungal disease present.     Left foot:      Protective Sensation: 5 sites tested. 5 sites sensed.      Toenail Condition: Left toenails are abnormally thick. Fungal disease present.     Comments: Excoriations on right 2nd toe  Neurological:      Mental Status: He is alert.         Labs:    HbA1c  Lab Results   Component Value Date    HGBA1C 6.9 04/24/2023       CMP  Lab Results   Component Value Date    GLUCOSE 143 (H) 06/11/2021    BUN 27 (H) 07/18/2022    CREATININE 1.80 (H) 07/18/2022    EGFRIFNONA 38 (L) 07/18/2022    EGFRIFAFRI 41 12/21/2022    BCR 15 07/18/2022    K 4.6 07/18/2022    CO2 22 07/18/2022    CALCIUM 10.3 (H) 07/18/2022    AST 21 07/18/2022    ALT 25 07/18/2022        Lipid Panel  Lab Results   Component Value Date    CHLPL 156 05/18/2022    HDL 39 (L) 05/18/2022    LDL 41.6 05/18/2022    TRIG 377 (H) 05/18/2022        TSH  Lab Results   Component Value Date    FREET4 1.2 04/05/2022        Hemoglobin A1C  Lab Results   Component Value Date    HGBA1C 6.9 04/24/2023        Microalbumin/Creatinine  Lab Results   Component Value Date    MALBCRERATIO 22.7 10/09/2020    MICROALBUR 3.2 10/09/2020           Assessment / Plan      Assessment & Plan:  Diagnoses and all orders for this visit:    1. Type 2 diabetes mellitus with hyperglycemia, without long-term current use of insulin (Primary)  Assessment & Plan:  Diabetes is unchanged.   Continue current treatment regimen.  Diabetes will be reassessed in 3 months.    Orders:  -     POC Glucose, Blood  -     POC Glycosylated Hemoglobin (Hb A1C)  -     Lipid Panel; Future  -     Microalbumin / Creatinine Urine Ratio - Urine, Clean Catch; Future    2. Type II diabetes mellitus with neurological manifestations  Assessment & Plan:  Foot exam today showed decreased senstation with some skin breakdown but no evidence of infection at this time.  Continue podiatry follow up.      3. Mixed hyperlipidemia  Assessment & Plan:  Continue statin.  Check lipids  today.      Current Outpatient Medications   Medication Instructions   • atorvastatin (LIPITOR) 10 mg, Oral, Daily   • Bydureon BCise 2 MG/0.85ML auto-injector injection INJECT ONE SYRINGE SUBCUTANEOUSLY EVERY WEEK   • Capsaicin-Cleansing Gel 8 % kit Apply 2-3 times a day as needed.   • Eliquis 5 mg, Oral, 2 Times Daily   • gabapentin (NEURONTIN) 100 MG capsule No dose, route, or frequency recorded.   • gabapentin (NEURONTIN) 600 mg, Oral, 3 Times Daily, Take 1 capsule three times a day.   • glucose blood (OneTouch Verio) test strip Testing 1x per day   • Jardiance 25 mg, Oral, Every Morning   • metFORMIN ER (GLUCOPHAGE-XR) 1,000 mg, Oral, 2 Times Daily   • multivitamin with minerals tablet tablet 1 tablet, Oral, Daily   • sildenafil (VIAGRA) 100 mg, Oral   • sotalol (BETAPACE) 80 mg, Oral, 2 times daily   • Testosterone Cypionate (DEPOTESTOTERONE CYPIONATE) 200 MG/ML injection No dose, route, or frequency recorded.   • timolol (TIMOPTIC) 0.5 % ophthalmic solution 2 times daily   • ULTICARE MINI PEN NEEDLES 31G X 6 MM misc No dose, route, or frequency recorded.   • venlafaxine XR (EFFEXOR-XR) 75 mg, Oral, Daily      Return in about 3 months (around 7/24/2023) for Recheck with A1c.    Alvaro Paredes MD   04/24/2023

## 2024-05-24 RX ORDER — BLOOD SUGAR DIAGNOSTIC
STRIP MISCELLANEOUS
Qty: 100 EACH | Refills: 3 | Status: SHIPPED | OUTPATIENT
Start: 2024-05-24

## 2024-05-24 NOTE — TELEPHONE ENCOUNTER
Rx Refill Note  Requested Prescriptions     Pending Prescriptions Disp Refills    glucose blood (OneTouch Verio) test strip [Pharmacy Med Name: ONETOUCH VERIO TEST STRIP] 100 each 3     Sig: USE AS DIRECTED TO TEST ONE TIME PER DAY          Last office visit with prescribing clinician: 4/24/2023     Next office visit with prescribing clinician: Visit date not found         Leslie Chandler MA  05/24/24, 10:52 EDT